# Patient Record
Sex: MALE | Race: WHITE | NOT HISPANIC OR LATINO | Employment: OTHER | URBAN - METROPOLITAN AREA
[De-identification: names, ages, dates, MRNs, and addresses within clinical notes are randomized per-mention and may not be internally consistent; named-entity substitution may affect disease eponyms.]

---

## 2023-07-25 ENCOUNTER — OFFICE VISIT (OUTPATIENT)
Dept: PRIMARY CARE CLINIC | Facility: CLINIC | Age: 43
End: 2023-07-25
Payer: COMMERCIAL

## 2023-07-25 ENCOUNTER — PATIENT MESSAGE (OUTPATIENT)
Dept: PRIMARY CARE CLINIC | Facility: CLINIC | Age: 43
End: 2023-07-25

## 2023-07-25 VITALS
BODY MASS INDEX: 38.23 KG/M2 | HEART RATE: 91 BPM | TEMPERATURE: 98 F | SYSTOLIC BLOOD PRESSURE: 136 MMHG | OXYGEN SATURATION: 96 % | RESPIRATION RATE: 18 BRPM | HEIGHT: 70 IN | DIASTOLIC BLOOD PRESSURE: 86 MMHG | WEIGHT: 267.06 LBS

## 2023-07-25 DIAGNOSIS — F17.210 CIGARETTE NICOTINE DEPENDENCE WITHOUT COMPLICATION: ICD-10-CM

## 2023-07-25 DIAGNOSIS — Z11.4 SCREENING FOR HIV (HUMAN IMMUNODEFICIENCY VIRUS): ICD-10-CM

## 2023-07-25 DIAGNOSIS — E66.01 SEVERE OBESITY (BMI 35.0-39.9) WITH COMORBIDITY: ICD-10-CM

## 2023-07-25 DIAGNOSIS — I10 ESSENTIAL HYPERTENSION, BENIGN: Primary | ICD-10-CM

## 2023-07-25 DIAGNOSIS — M10.071 IDIOPATHIC GOUT OF RIGHT FOOT, UNSPECIFIED CHRONICITY: ICD-10-CM

## 2023-07-25 DIAGNOSIS — Z13.6 ENCOUNTER FOR SCREENING FOR CARDIOVASCULAR DISORDERS: ICD-10-CM

## 2023-07-25 DIAGNOSIS — Z11.59 NEED FOR HEPATITIS C SCREENING TEST: ICD-10-CM

## 2023-07-25 DIAGNOSIS — Z13.1 SCREENING FOR DIABETES MELLITUS: ICD-10-CM

## 2023-07-25 PROBLEM — M10.9 GOUT, UNSPECIFIED: Status: ACTIVE | Noted: 2023-07-25

## 2023-07-25 PROCEDURE — 99204 OFFICE O/P NEW MOD 45 MIN: CPT | Mod: S$GLB,,, | Performed by: FAMILY MEDICINE

## 2023-07-25 PROCEDURE — 93010 ELECTROCARDIOGRAM REPORT: CPT | Mod: S$GLB,,, | Performed by: INTERNAL MEDICINE

## 2023-07-25 PROCEDURE — 99999 PR PBB SHADOW E&M-NEW PATIENT-LVL IV: CPT | Mod: PBBFAC,,, | Performed by: FAMILY MEDICINE

## 2023-07-25 PROCEDURE — 1160F PR REVIEW ALL MEDS BY PRESCRIBER/CLIN PHARMACIST DOCUMENTED: ICD-10-PCS | Mod: CPTII,S$GLB,, | Performed by: FAMILY MEDICINE

## 2023-07-25 PROCEDURE — 4010F ACE/ARB THERAPY RXD/TAKEN: CPT | Mod: CPTII,S$GLB,, | Performed by: FAMILY MEDICINE

## 2023-07-25 PROCEDURE — 99204 PR OFFICE/OUTPT VISIT, NEW, LEVL IV, 45-59 MIN: ICD-10-PCS | Mod: S$GLB,,, | Performed by: FAMILY MEDICINE

## 2023-07-25 PROCEDURE — 93005 EKG 12-LEAD: ICD-10-PCS | Mod: S$GLB,,, | Performed by: FAMILY MEDICINE

## 2023-07-25 PROCEDURE — 4010F PR ACE/ARB THEARPY RXD/TAKEN: ICD-10-PCS | Mod: CPTII,S$GLB,, | Performed by: FAMILY MEDICINE

## 2023-07-25 PROCEDURE — 93005 ELECTROCARDIOGRAM TRACING: CPT | Mod: S$GLB,,, | Performed by: FAMILY MEDICINE

## 2023-07-25 PROCEDURE — 3079F PR MOST RECENT DIASTOLIC BLOOD PRESSURE 80-89 MM HG: ICD-10-PCS | Mod: CPTII,S$GLB,, | Performed by: FAMILY MEDICINE

## 2023-07-25 PROCEDURE — 93010 EKG 12-LEAD: ICD-10-PCS | Mod: S$GLB,,, | Performed by: INTERNAL MEDICINE

## 2023-07-25 PROCEDURE — 3008F BODY MASS INDEX DOCD: CPT | Mod: CPTII,S$GLB,, | Performed by: FAMILY MEDICINE

## 2023-07-25 PROCEDURE — 3008F PR BODY MASS INDEX (BMI) DOCUMENTED: ICD-10-PCS | Mod: CPTII,S$GLB,, | Performed by: FAMILY MEDICINE

## 2023-07-25 PROCEDURE — 1159F MED LIST DOCD IN RCRD: CPT | Mod: CPTII,S$GLB,, | Performed by: FAMILY MEDICINE

## 2023-07-25 PROCEDURE — 99999 PR PBB SHADOW E&M-NEW PATIENT-LVL IV: ICD-10-PCS | Mod: PBBFAC,,, | Performed by: FAMILY MEDICINE

## 2023-07-25 PROCEDURE — 1159F PR MEDICATION LIST DOCUMENTED IN MEDICAL RECORD: ICD-10-PCS | Mod: CPTII,S$GLB,, | Performed by: FAMILY MEDICINE

## 2023-07-25 PROCEDURE — 3075F PR MOST RECENT SYSTOLIC BLOOD PRESS GE 130-139MM HG: ICD-10-PCS | Mod: CPTII,S$GLB,, | Performed by: FAMILY MEDICINE

## 2023-07-25 PROCEDURE — 3075F SYST BP GE 130 - 139MM HG: CPT | Mod: CPTII,S$GLB,, | Performed by: FAMILY MEDICINE

## 2023-07-25 PROCEDURE — 1160F RVW MEDS BY RX/DR IN RCRD: CPT | Mod: CPTII,S$GLB,, | Performed by: FAMILY MEDICINE

## 2023-07-25 PROCEDURE — 3079F DIAST BP 80-89 MM HG: CPT | Mod: CPTII,S$GLB,, | Performed by: FAMILY MEDICINE

## 2023-07-25 RX ORDER — LOSARTAN POTASSIUM 50 MG/1
50 TABLET ORAL DAILY
Qty: 90 TABLET | Refills: 3 | Status: SHIPPED | OUTPATIENT
Start: 2023-07-25 | End: 2023-08-23

## 2023-07-25 NOTE — PROGRESS NOTES
"Subjective:       Patient ID: Jeremy Fried is a 43 y.o. male.    Chief Complaint: Establish Care    Here for regular checkup and to establish care.  Has not had steady PCP in about a decade.  Went to urgent care about a year ago and was diagnosed with hypertension, says blood pressure had been consistently running in the 160s.  Was treated with lisinopril for a few months, which controls his blood pressure.  Has been out of medication since then, and his home blood pressure readings have been consistently elevated, as high as 160/120.    Review of Systems   Constitutional:  Positive for fatigue and unexpected weight change.   Respiratory:  Positive for shortness of breath.    Cardiovascular:  Negative for chest pain.   Gastrointestinal:  Negative for blood in stool.   Genitourinary:  Negative for difficulty urinating.   Allergic/Immunologic: Negative for immunocompromised state.   Neurological:  Negative for dizziness and light-headedness.   Hematological:  Does not bruise/bleed easily.   Psychiatric/Behavioral:  Negative for agitation and confusion.      Objective:      Vitals:    07/25/23 1005   BP: 136/86   BP Location: Left arm   Patient Position: Sitting   BP Method: Large (Manual)   Pulse: 91   Resp: 18   Temp: 97.6 °F (36.4 °C)   TempSrc: Temporal   SpO2: 96%   Weight: 121.2 kg (267 lb 1.4 oz)   Height: 5' 10" (1.778 m)     BP Readings from Last 5 Encounters:   07/25/23 136/86   06/16/16 (!) 143/89     Wt Readings from Last 5 Encounters:   07/25/23 121.2 kg (267 lb 1.4 oz)   06/16/16 104.8 kg (231 lb)     Physical Exam  Vitals and nursing note reviewed.   Constitutional:       General: He is not in acute distress.     Appearance: Normal appearance. He is well-developed.   HENT:      Head: Normocephalic and atraumatic.   Cardiovascular:      Rate and Rhythm: Normal rate and regular rhythm.      Heart sounds: Normal heart sounds.   Pulmonary:      Effort: Pulmonary effort is normal.      Breath sounds: Normal " breath sounds.   Abdominal:      General: There is no distension.      Tenderness: There is no abdominal tenderness.   Musculoskeletal:      Cervical back: Neck supple.      Right lower leg: No edema.      Left lower leg: No edema.   Skin:     General: Skin is warm and dry.   Neurological:      Mental Status: He is alert and oriented to person, place, and time.   Psychiatric:         Mood and Affect: Mood normal.         Behavior: Behavior normal.       Lab Results   Component Value Date    WBC 10.67 07/25/2023    HGB 15.2 07/25/2023    HCT 45.1 07/25/2023     07/25/2023    CHOL 221 (H) 07/25/2023    TRIG 182 (H) 07/25/2023    HDL 27 (L) 07/25/2023    ALT 49 (H) 07/25/2023    AST 29 07/25/2023     07/25/2023    K 4.2 07/25/2023     07/25/2023    CREATININE 1.0 07/25/2023    BUN 11 07/25/2023    CO2 24 07/25/2023    TSH 1.379 07/25/2023      Assessment:       1. Essential hypertension, benign    2. Severe obesity (BMI 35.0-39.9) with comorbidity    3. Screening for diabetes mellitus    4. Need for hepatitis C screening test    5. Screening for HIV (human immunodeficiency virus)    6. Encounter for screening for cardiovascular disorders    7. Cigarette nicotine dependence without complication    8. Idiopathic gout of right foot, unspecified chronicity        Plan:       Essential hypertension, benign  -     EKG 12-lead  -     CBC Auto Differential; Future; Expected date: 07/25/2023  -     Comprehensive Metabolic Panel; Future; Expected date: 07/25/2023  -     TSH; Future; Expected date: 07/25/2023  -     losartan (COZAAR) 50 MG tablet; Take 1 tablet (50 mg total) by mouth once daily.  Dispense: 90 tablet; Refill: 3  No acute EKG findings. Start losartan 50 milligrams daily.  Will message for updated home readings in 2 weeks.    Severe obesity (BMI 35.0-39.9) with comorbidity    Screening for diabetes mellitus  -     Hemoglobin A1C; Future; Expected date: 07/25/2023    Need for hepatitis C screening  test  -     Hepatitis C Antibody; Future; Expected date: 07/25/2023    Screening for HIV (human immunodeficiency virus)  -     HIV 1/2 Ag/Ab (4th Gen); Future; Expected date: 07/25/2023    Encounter for screening for cardiovascular disorders  -     Lipid Panel; Future; Expected date: 07/25/2023    Cigarette nicotine dependence without complication  -     Ambulatory referral/consult to Smoking Cessation Program; Future; Expected date: 08/01/2023    Idiopathic gout of right foot, unspecified chronicity  -     Uric Acid; Future; Expected date: 07/25/2023      Medication List with Changes/Refills   New Medications    LOSARTAN (COZAAR) 50 MG TABLET    Take 1 tablet (50 mg total) by mouth once daily.   Discontinued Medications    AMOXICILLIN (AMOXIL) 500 MG CAPSULE    Take 500 mg by mouth 3 (three) times daily.

## 2023-08-09 ENCOUNTER — PATIENT MESSAGE (OUTPATIENT)
Dept: PRIMARY CARE CLINIC | Facility: CLINIC | Age: 43
End: 2023-08-09
Payer: COMMERCIAL

## 2023-08-16 ENCOUNTER — PATIENT MESSAGE (OUTPATIENT)
Dept: PRIMARY CARE CLINIC | Facility: CLINIC | Age: 43
End: 2023-08-16
Payer: COMMERCIAL

## 2023-08-23 ENCOUNTER — PATIENT MESSAGE (OUTPATIENT)
Dept: PRIMARY CARE CLINIC | Facility: CLINIC | Age: 43
End: 2023-08-23
Payer: COMMERCIAL

## 2023-08-23 RX ORDER — OLMESARTAN MEDOXOMIL 40 MG/1
40 TABLET ORAL DAILY
Qty: 90 TABLET | Refills: 3 | Status: SHIPPED | OUTPATIENT
Start: 2023-08-23 | End: 2024-08-22

## 2023-09-18 ENCOUNTER — PATIENT MESSAGE (OUTPATIENT)
Dept: PRIMARY CARE CLINIC | Facility: CLINIC | Age: 43
End: 2023-09-18
Payer: COMMERCIAL

## 2023-10-18 ENCOUNTER — PATIENT MESSAGE (OUTPATIENT)
Dept: CARDIOLOGY | Facility: CLINIC | Age: 43
End: 2023-10-18
Payer: COMMERCIAL

## 2023-10-18 ENCOUNTER — PATIENT MESSAGE (OUTPATIENT)
Dept: PRIMARY CARE CLINIC | Facility: CLINIC | Age: 43
End: 2023-10-18
Payer: COMMERCIAL

## 2023-10-18 DIAGNOSIS — I10 ESSENTIAL HYPERTENSION, BENIGN: ICD-10-CM

## 2023-10-18 DIAGNOSIS — R09.89 LABILE BLOOD PRESSURE: Primary | ICD-10-CM

## 2023-10-25 ENCOUNTER — TELEPHONE (OUTPATIENT)
Dept: CARDIOLOGY | Facility: CLINIC | Age: 43
End: 2023-10-25

## 2023-10-25 ENCOUNTER — OFFICE VISIT (OUTPATIENT)
Dept: CARDIOLOGY | Facility: CLINIC | Age: 43
End: 2023-10-25
Payer: COMMERCIAL

## 2023-10-25 VITALS
BODY MASS INDEX: 38.8 KG/M2 | OXYGEN SATURATION: 92 % | SYSTOLIC BLOOD PRESSURE: 180 MMHG | WEIGHT: 270.38 LBS | HEART RATE: 93 BPM | DIASTOLIC BLOOD PRESSURE: 120 MMHG

## 2023-10-25 DIAGNOSIS — R55 SYNCOPE AND COLLAPSE: ICD-10-CM

## 2023-10-25 DIAGNOSIS — E79.0 HYPERURICEMIA: ICD-10-CM

## 2023-10-25 DIAGNOSIS — I10 ESSENTIAL HYPERTENSION, BENIGN: ICD-10-CM

## 2023-10-25 DIAGNOSIS — R06.09 DYSPNEA ON EXERTION: ICD-10-CM

## 2023-10-25 DIAGNOSIS — E78.2 MIXED HYPERLIPIDEMIA: Primary | ICD-10-CM

## 2023-10-25 DIAGNOSIS — M10.00 IDIOPATHIC GOUT, UNSPECIFIED CHRONICITY, UNSPECIFIED SITE: ICD-10-CM

## 2023-10-25 DIAGNOSIS — R09.89 LABILE BLOOD PRESSURE: ICD-10-CM

## 2023-10-25 PROCEDURE — 93000 ELECTROCARDIOGRAM COMPLETE: CPT | Mod: S$GLB,,, | Performed by: INTERNAL MEDICINE

## 2023-10-25 PROCEDURE — 3008F PR BODY MASS INDEX (BMI) DOCUMENTED: ICD-10-PCS | Mod: CPTII,S$GLB,, | Performed by: INTERNAL MEDICINE

## 2023-10-25 PROCEDURE — 99999 PR PBB SHADOW E&M-EST. PATIENT-LVL IV: CPT | Mod: PBBFAC,,, | Performed by: INTERNAL MEDICINE

## 2023-10-25 PROCEDURE — 3044F HG A1C LEVEL LT 7.0%: CPT | Mod: CPTII,S$GLB,, | Performed by: INTERNAL MEDICINE

## 2023-10-25 PROCEDURE — 99205 PR OFFICE/OUTPT VISIT, NEW, LEVL V, 60-74 MIN: ICD-10-PCS | Mod: 25,S$GLB,, | Performed by: INTERNAL MEDICINE

## 2023-10-25 PROCEDURE — 99205 OFFICE O/P NEW HI 60 MIN: CPT | Mod: 25,S$GLB,, | Performed by: INTERNAL MEDICINE

## 2023-10-25 PROCEDURE — 3044F PR MOST RECENT HEMOGLOBIN A1C LEVEL <7.0%: ICD-10-PCS | Mod: CPTII,S$GLB,, | Performed by: INTERNAL MEDICINE

## 2023-10-25 PROCEDURE — 3080F DIAST BP >= 90 MM HG: CPT | Mod: CPTII,S$GLB,, | Performed by: INTERNAL MEDICINE

## 2023-10-25 PROCEDURE — 99999 PR PBB SHADOW E&M-EST. PATIENT-LVL IV: ICD-10-PCS | Mod: PBBFAC,,, | Performed by: INTERNAL MEDICINE

## 2023-10-25 PROCEDURE — 1160F PR REVIEW ALL MEDS BY PRESCRIBER/CLIN PHARMACIST DOCUMENTED: ICD-10-PCS | Mod: CPTII,S$GLB,, | Performed by: INTERNAL MEDICINE

## 2023-10-25 PROCEDURE — 4010F PR ACE/ARB THEARPY RXD/TAKEN: ICD-10-PCS | Mod: CPTII,S$GLB,, | Performed by: INTERNAL MEDICINE

## 2023-10-25 PROCEDURE — 3077F SYST BP >= 140 MM HG: CPT | Mod: CPTII,S$GLB,, | Performed by: INTERNAL MEDICINE

## 2023-10-25 PROCEDURE — 3008F BODY MASS INDEX DOCD: CPT | Mod: CPTII,S$GLB,, | Performed by: INTERNAL MEDICINE

## 2023-10-25 PROCEDURE — 93000 EKG 12-LEAD: ICD-10-PCS | Mod: S$GLB,,, | Performed by: INTERNAL MEDICINE

## 2023-10-25 PROCEDURE — 3080F PR MOST RECENT DIASTOLIC BLOOD PRESSURE >= 90 MM HG: ICD-10-PCS | Mod: CPTII,S$GLB,, | Performed by: INTERNAL MEDICINE

## 2023-10-25 PROCEDURE — 1159F MED LIST DOCD IN RCRD: CPT | Mod: CPTII,S$GLB,, | Performed by: INTERNAL MEDICINE

## 2023-10-25 PROCEDURE — 3077F PR MOST RECENT SYSTOLIC BLOOD PRESSURE >= 140 MM HG: ICD-10-PCS | Mod: CPTII,S$GLB,, | Performed by: INTERNAL MEDICINE

## 2023-10-25 PROCEDURE — 4010F ACE/ARB THERAPY RXD/TAKEN: CPT | Mod: CPTII,S$GLB,, | Performed by: INTERNAL MEDICINE

## 2023-10-25 PROCEDURE — 1159F PR MEDICATION LIST DOCUMENTED IN MEDICAL RECORD: ICD-10-PCS | Mod: CPTII,S$GLB,, | Performed by: INTERNAL MEDICINE

## 2023-10-25 PROCEDURE — 1160F RVW MEDS BY RX/DR IN RCRD: CPT | Mod: CPTII,S$GLB,, | Performed by: INTERNAL MEDICINE

## 2023-10-25 RX ORDER — EPLERENONE 25 MG/1
25 TABLET, FILM COATED ORAL DAILY
Qty: 30 TABLET | Refills: 11 | Status: SHIPPED | OUTPATIENT
Start: 2023-10-25 | End: 2024-04-02
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
## 2023-11-06 ENCOUNTER — TELEPHONE (OUTPATIENT)
Dept: CARDIOLOGY | Facility: CLINIC | Age: 43
End: 2023-11-06
Payer: COMMERCIAL

## 2023-11-06 LAB
CV STRESS BASE HR: 117 BPM
DIASTOLIC BLOOD PRESSURE: 103 MMHG
OHS CV CPX 1 MINUTE RECOVERY HEART RATE: 150 BPM
OHS CV CPX 85 PERCENT MAX PREDICTED HEART RATE MALE: 152
OHS CV CPX ESTIMATED METS: 10
OHS CV CPX MAX PREDICTED HEART RATE: 179
OHS CV CPX PATIENT IS FEMALE: 0
OHS CV CPX PATIENT IS MALE: 1
OHS CV CPX PEAK DIASTOLIC BLOOD PRESSURE: 78 MMHG
OHS CV CPX PEAK HEAR RATE: 160 BPM
OHS CV CPX PEAK RATE PRESSURE PRODUCT: NORMAL
OHS CV CPX PEAK SYSTOLIC BLOOD PRESSURE: 244 MMHG
OHS CV CPX PERCENT MAX PREDICTED HEART RATE ACHIEVED: 89
OHS CV CPX RATE PRESSURE PRODUCT PRESENTING: NORMAL
STRESS ECHO POST EXERCISE DUR MIN: 8 MINUTES
STRESS ECHO POST EXERCISE DUR SEC: 0 SECONDS
SYSTOLIC BLOOD PRESSURE: 173 MMHG

## 2023-11-07 NOTE — TELEPHONE ENCOUNTER
I spoke with pt:  CXR and echocardiogram and stress test and CT of the brain WNL.  Pt reassured  Pt reports BP is varying from 98/60 to 140/115 at home.  Pt has a f/u office appt.

## 2023-11-08 ENCOUNTER — TELEPHONE (OUTPATIENT)
Dept: CARDIOLOGY | Facility: CLINIC | Age: 43
End: 2023-11-08
Payer: COMMERCIAL

## 2023-11-08 NOTE — TELEPHONE ENCOUNTER
I spoke with pt:  Holter is normal.  Heart rate is in a good range and there are rare extra beats which do not require treatment.  Pt reassured

## 2023-11-22 ENCOUNTER — TELEPHONE (OUTPATIENT)
Dept: CARDIOLOGY | Facility: CLINIC | Age: 43
End: 2023-11-22

## 2023-11-22 ENCOUNTER — OFFICE VISIT (OUTPATIENT)
Dept: CARDIOLOGY | Facility: CLINIC | Age: 43
End: 2023-11-22
Payer: COMMERCIAL

## 2023-11-22 VITALS
DIASTOLIC BLOOD PRESSURE: 74 MMHG | OXYGEN SATURATION: 96 % | BODY MASS INDEX: 38.58 KG/M2 | HEART RATE: 86 BPM | WEIGHT: 268.88 LBS | SYSTOLIC BLOOD PRESSURE: 125 MMHG

## 2023-11-22 DIAGNOSIS — F17.210 CIGARETTE NICOTINE DEPENDENCE WITHOUT COMPLICATION: ICD-10-CM

## 2023-11-22 DIAGNOSIS — I10 ESSENTIAL HYPERTENSION, BENIGN: Primary | ICD-10-CM

## 2023-11-22 DIAGNOSIS — M10.00 IDIOPATHIC GOUT, UNSPECIFIED CHRONICITY, UNSPECIFIED SITE: ICD-10-CM

## 2023-11-22 DIAGNOSIS — E78.2 MIXED HYPERLIPIDEMIA: ICD-10-CM

## 2023-11-22 PROCEDURE — 4010F PR ACE/ARB THEARPY RXD/TAKEN: ICD-10-PCS | Mod: CPTII,S$GLB,, | Performed by: INTERNAL MEDICINE

## 2023-11-22 PROCEDURE — 1160F PR REVIEW ALL MEDS BY PRESCRIBER/CLIN PHARMACIST DOCUMENTED: ICD-10-PCS | Mod: CPTII,S$GLB,, | Performed by: INTERNAL MEDICINE

## 2023-11-22 PROCEDURE — 3074F PR MOST RECENT SYSTOLIC BLOOD PRESSURE < 130 MM HG: ICD-10-PCS | Mod: CPTII,S$GLB,, | Performed by: INTERNAL MEDICINE

## 2023-11-22 PROCEDURE — 3008F PR BODY MASS INDEX (BMI) DOCUMENTED: ICD-10-PCS | Mod: CPTII,S$GLB,, | Performed by: INTERNAL MEDICINE

## 2023-11-22 PROCEDURE — 1159F MED LIST DOCD IN RCRD: CPT | Mod: CPTII,S$GLB,, | Performed by: INTERNAL MEDICINE

## 2023-11-22 PROCEDURE — 3078F PR MOST RECENT DIASTOLIC BLOOD PRESSURE < 80 MM HG: ICD-10-PCS | Mod: CPTII,S$GLB,, | Performed by: INTERNAL MEDICINE

## 2023-11-22 PROCEDURE — 3008F BODY MASS INDEX DOCD: CPT | Mod: CPTII,S$GLB,, | Performed by: INTERNAL MEDICINE

## 2023-11-22 PROCEDURE — 99213 PR OFFICE/OUTPT VISIT, EST, LEVL III, 20-29 MIN: ICD-10-PCS | Mod: S$GLB,,, | Performed by: INTERNAL MEDICINE

## 2023-11-22 PROCEDURE — 3074F SYST BP LT 130 MM HG: CPT | Mod: CPTII,S$GLB,, | Performed by: INTERNAL MEDICINE

## 2023-11-22 PROCEDURE — 3044F PR MOST RECENT HEMOGLOBIN A1C LEVEL <7.0%: ICD-10-PCS | Mod: CPTII,S$GLB,, | Performed by: INTERNAL MEDICINE

## 2023-11-22 PROCEDURE — 3044F HG A1C LEVEL LT 7.0%: CPT | Mod: CPTII,S$GLB,, | Performed by: INTERNAL MEDICINE

## 2023-11-22 PROCEDURE — 99213 OFFICE O/P EST LOW 20 MIN: CPT | Mod: S$GLB,,, | Performed by: INTERNAL MEDICINE

## 2023-11-22 PROCEDURE — 4010F ACE/ARB THERAPY RXD/TAKEN: CPT | Mod: CPTII,S$GLB,, | Performed by: INTERNAL MEDICINE

## 2023-11-22 PROCEDURE — 3078F DIAST BP <80 MM HG: CPT | Mod: CPTII,S$GLB,, | Performed by: INTERNAL MEDICINE

## 2023-11-22 PROCEDURE — 99999 PR PBB SHADOW E&M-EST. PATIENT-LVL III: ICD-10-PCS | Mod: PBBFAC,,, | Performed by: INTERNAL MEDICINE

## 2023-11-22 PROCEDURE — 1160F RVW MEDS BY RX/DR IN RCRD: CPT | Mod: CPTII,S$GLB,, | Performed by: INTERNAL MEDICINE

## 2023-11-22 PROCEDURE — 99999 PR PBB SHADOW E&M-EST. PATIENT-LVL III: CPT | Mod: PBBFAC,,, | Performed by: INTERNAL MEDICINE

## 2023-11-22 PROCEDURE — 1159F PR MEDICATION LIST DOCUMENTED IN MEDICAL RECORD: ICD-10-PCS | Mod: CPTII,S$GLB,, | Performed by: INTERNAL MEDICINE

## 2023-11-22 NOTE — PROGRESS NOTES
Subjective:      Patient ID: Jeremy Fried is a 43 y.o. male.    Chief Complaint: Follow-up    HPI:  No side effects from eplerenone    BP at home 130/90, once 150/90, lowest 99/65    No recurrent fainting    Pt once felt lightheaded for a split second upon arising quickly from the chair      Review of Systems   Cardiovascular:  Negative for chest pain, claudication, dyspnea on exertion, irregular heartbeat, leg swelling, near-syncope, orthopnea, palpitations and syncope.      Pt has cut back on ETOH    Smokes 20 cigarettes a day    Past Medical History:   Diagnosis Date    Gout, unspecified     Hypertension     Mixed hyperlipidemia 10/25/2023    Syncope and collapse     Thyroid disease         Past Surgical History:   Procedure Laterality Date    LIPOMA RESECTION  2000    Right shoulder lipoma removed       Family History   Problem Relation Age of Onset    Diabetes Mother     Hyperlipidemia Mother     Hypertension Mother     Lymphoma Mother     Hypertension Father     Cancer Father     Diabetes Father     Hyperlipidemia Father     No Known Problems Sister     No Known Problems Brother        Social History     Socioeconomic History    Marital status:    Occupational History    Occupation: swimming pool repair   Tobacco Use    Smoking status: Every Day     Current packs/day: 1.00     Average packs/day: 1 pack/day for 24.9 years (24.9 ttl pk-yrs)     Types: Cigarettes     Start date: 1999    Tobacco comments:     has tried wellbutrin, cutting back , patches, and gum   Substance and Sexual Activity    Alcohol use: Yes     Alcohol/week: 18.0 standard drinks of alcohol     Types: 3 Cans of beer, 15 Shots of liquor per week    Drug use: No    Sexual activity: Yes     Partners: Male     Birth control/protection: None       Current Outpatient Medications on File Prior to Visit   Medication Sig Dispense Refill    eplerenone (INSPRA) 25 MG Tab Take 1 tablet (25 mg total) by mouth once daily. 30 tablet 11     olmesartan (BENICAR) 40 MG tablet Take 1 tablet (40 mg total) by mouth once daily. 90 tablet 3     No current facility-administered medications on file prior to visit.       Review of patient's allergies indicates:  No Known Allergies  Objective:     Vitals:    11/22/23 1016   BP: 125/74   BP Location: Left arm   Patient Position: Sitting   BP Method: Large (Automatic)   Pulse: 86   SpO2: 96%   Weight: 122 kg (268 lb 13.6 oz)        Physical Exam  Constitutional:       General: He is not in acute distress.     Appearance: He is well-developed. He is not diaphoretic.   Eyes:      General: No scleral icterus.  Neck:      Vascular: No carotid bruit or JVD.   Cardiovascular:      Rate and Rhythm: Regular rhythm.      Heart sounds: Normal heart sounds. No murmur heard.     No friction rub. No gallop.   Pulmonary:      Effort: Pulmonary effort is normal. No respiratory distress.      Breath sounds: Normal breath sounds.   Musculoskeletal:      Right lower leg: No edema.      Left lower leg: No edema.   Skin:     General: Skin is warm and dry.   Neurological:      Mental Status: He is alert and oriented to person, place, and time.   Psychiatric:         Behavior: Behavior normal.         Thought Content: Thought content normal.         Judgment: Judgment normal.                Hospital Outpatient Visit on 11/03/2023   Component Date Value Ref Range Status    Event Monitor Day 11/03/2023 2   Final    holter length minutes 11/03/2023 59   Final    Holter length hours 11/03/2023 47   Final    holter length dec hours 11/03/2023 95.98   Final    Sinus min HR 11/03/2023 45   Final    Sinus max hr 11/03/2023 141   Final    Sinus avg hr 11/03/2023 94   Final   Hospital Outpatient Visit on 11/03/2023   Component Date Value Ref Range Status    BSA 11/03/2023 2.46  m2 Final    LVOT stroke volume 11/03/2023 53.28  cm3 Final    LVIDd 11/03/2023 4.77  3.5 - 6.0 cm Final    LV Systolic Volume 11/03/2023 25.80  mL Final    LV Systolic  Volume Index 11/03/2023 10.9  mL/m2 Final    LVIDs 11/03/2023 2.65  2.1 - 4.0 cm Final    LV Diastolic Volume 11/03/2023 106.19  mL Final    LV Diastolic Volume Index 11/03/2023 44.81  mL/m2 Final    IVS 11/03/2023 1.1  0.6 - 1.1 cm Final    LVOT diameter 11/03/2023 1.89  cm Final    LVOT area 11/03/2023 2.8  cm2 Final    FS 11/03/2023 44  28 - 44 % Final    Left Ventricle Relative Wall Thick* 11/03/2023 0.46  cm Final    Posterior Wall 11/03/2023 1.10  0.6 - 1.1 cm Final    LV mass 11/03/2023 192.02  g Final    LV Mass Index 11/03/2023 81  g/m2 Final    MV Peak E Dl 11/03/2023 0.52  m/s Final    TDI LATERAL 11/03/2023 0.13  m/s Final    TDI SEPTAL 11/03/2023 0.08  m/s Final    E/E' ratio 11/03/2023 4.95  m/s Final    MV Peak A Dl 11/03/2023 0.70  m/s Final    TR Max Dl 11/03/2023 2.41  m/s Final    E/A ratio 11/03/2023 0.74   Final    E wave deceleration time 11/03/2023 166.85  msec Final    LV SEPTAL E/E' RATIO 11/03/2023 6.50  m/s Final    LV LATERAL E/E' RATIO 11/03/2023 4.00  m/s Final    LVOT peak dl 11/03/2023 1.02  m/s Final    Left Ventricular Outflow Tract Svitlana* 11/03/2023 0.70  cm/s Final    Left Ventricular Outflow Tract Svitlana* 11/03/2023 2.27  mmHg Final    RVDD 11/03/2023 2.69  cm Final    Left Atrium Minor Axis 11/03/2023 2.52  cm Final    Left Atrium Major Axis 11/03/2023 4.85  cm Final    AV peak gradient 11/03/2023 7  mmHg Final    Ao peak dl 11/03/2023 1.28  m/s Final    LVOT peak VTI 11/03/2023 19.00  cm Final    AV Velocity Ratio 11/03/2023 0.80   Final    LEIGHTON by Velocity Ratio 11/03/2023 2.23  cm² Final    MV stenosis pressure 1/2 time 11/03/2023 48.39  ms Final    MV valve area p 1/2 method 11/03/2023 4.55  cm2 Final    Triscuspid Valve Regurgitation Pea* 11/03/2023 23  mmHg Final    PV PEAK VELOCITY 11/03/2023 1.06  m/s Final    PV peak gradient 11/03/2023 4  mmHg Final    Ao root annulus 11/03/2023 3.66  cm Final    IVC diameter 11/03/2023 1.60  cm Final    Mean e' 11/03/2023 0.11  m/s  Final    ZLVIDS 11/03/2023 -6.65   Final    ZLVIDD 11/03/2023 -7.65   Final    AORTIC VALVE CUSP SEPERATION 11/03/2023 1.88  cm Final    TV resting pulmonary artery pressu* 11/03/2023 26  mmHg Final    RV TB RVSP 11/03/2023 5  mmHg Final    Est. RA pres 11/03/2023 3  mmHg Final    EF 11/03/2023 65  % Final   Office Visit on 10/25/2023   Component Date Value Ref Range Status    85% Max Predicted HR 11/03/2023 152   Final    Max Predicted HR 11/03/2023 179   Final    [facility]HS CV CPX PATIENT IS MALE 11/03/2023 1.0   Final    [facility] CV CPX PATIENT IS FEMALE 11/03/2023 0.0   Final    Systolic blood pressure 11/03/2023 173  mmHg Final    Diastolic blood pressure 11/03/2023 103  mmHg Final    HR at rest 11/03/2023 117  bpm Final    Exercise duration (min) 11/03/2023 8  minutes Final    Exercise duration (sec) 11/03/2023 0  seconds Final    Peak Systolic BP 11/03/2023 244  mmHg Final    Peak Diatolic BP 11/03/2023 78  mmHg Final    Peak HR 11/03/2023 160  bpm Final    Estimated METs 11/03/2023 10   Final    % Max HR Achieved 11/03/2023 89   Final    1 Minute Recovery HR 11/03/2023 150  bpm Final    RPP 11/03/2023 20,241   Final    Peak RPP 11/03/2023 39,040   Final   Lab Visit on 07/25/2023   Component Date Value Ref Range Status    WBC 07/25/2023 10.67  3.90 - 12.70 K/uL Final    RBC 07/25/2023 4.64  4.60 - 6.20 M/uL Final    Hemoglobin 07/25/2023 15.2  14.0 - 18.0 g/dL Final    Hematocrit 07/25/2023 45.1  40.0 - 54.0 % Final    MCV 07/25/2023 97  82 - 98 fL Final    MCH 07/25/2023 32.8 (H)  27.0 - 31.0 pg Final    MCHC 07/25/2023 33.7  32.0 - 36.0 g/dL Final    RDW 07/25/2023 12.3  11.5 - 14.5 % Final    Platelets 07/25/2023 286  150 - 450 K/uL Final    MPV 07/25/2023 9.7  9.2 - 12.9 fL Final    Immature Granulocytes 07/25/2023 0.3  0.0 - 0.5 % Final    Gran # (ANC) 07/25/2023 6.6  1.8 - 7.7 K/uL Final    Immature Grans (Abs) 07/25/2023 0.03  0.00 - 0.04 K/uL Final    Lymph # 07/25/2023 2.8  1.0 - 4.8 K/uL Final    Mono #  07/25/2023 0.7  0.3 - 1.0 K/uL Final    Eos # 07/25/2023 0.4  0.0 - 0.5 K/uL Final    Baso # 07/25/2023 0.10  0.00 - 0.20 K/uL Final    nRBC 07/25/2023 0  0 /100 WBC Final    Gran % 07/25/2023 62.0  38.0 - 73.0 % Final    Lymph % 07/25/2023 25.9  18.0 - 48.0 % Final    Mono % 07/25/2023 6.9  4.0 - 15.0 % Final    Eosinophil % 07/25/2023 4.0  0.0 - 8.0 % Final    Basophil % 07/25/2023 0.9  0.0 - 1.9 % Final    Differential Method 07/25/2023 Automated   Final    Sodium 07/25/2023 139  136 - 145 mmol/L Final    Potassium 07/25/2023 4.2  3.5 - 5.1 mmol/L Final    Chloride 07/25/2023 104  95 - 110 mmol/L Final    CO2 07/25/2023 24  23 - 29 mmol/L Final    Glucose 07/25/2023 85  70 - 110 mg/dL Final    BUN 07/25/2023 11  6 - 20 mg/dL Final    Creatinine 07/25/2023 1.0  0.5 - 1.4 mg/dL Final    Calcium 07/25/2023 9.6  8.7 - 10.5 mg/dL Final    Total Protein 07/25/2023 7.9  6.0 - 8.4 g/dL Final    Albumin 07/25/2023 4.2  3.5 - 5.2 g/dL Final    Total Bilirubin 07/25/2023 0.7  0.1 - 1.0 mg/dL Final    Alkaline Phosphatase 07/25/2023 60  55 - 135 U/L Final    AST 07/25/2023 29  10 - 40 U/L Final    ALT 07/25/2023 49 (H)  10 - 44 U/L Final    eGFR 07/25/2023 >60.0  >60 mL/min/1.73 m^2 Final    Anion Gap 07/25/2023 11  8 - 16 mmol/L Final    Cholesterol 07/25/2023 221 (H)  120 - 199 mg/dL Final    Triglycerides 07/25/2023 182 (H)  30 - 150 mg/dL Final    HDL 07/25/2023 27 (L)  40 - 75 mg/dL Final    LDL Cholesterol 07/25/2023 157.6  63.0 - 159.0 mg/dL Final    HDL/Cholesterol Ratio 07/25/2023 12.2 (L)  20.0 - 50.0 % Final    Total Cholesterol/HDL Ratio 07/25/2023 8.2 (H)  2.0 - 5.0 Final    Non-HDL Cholesterol 07/25/2023 194  mg/dL Final    Hemoglobin A1C 07/25/2023 5.1  4.0 - 5.6 % Final    Estimated Avg Glucose 07/25/2023 100  68 - 131 mg/dL Final    TSH 07/25/2023 1.379  0.400 - 4.000 uIU/mL Final    Hepatitis C Ab 07/25/2023 Non-reactive  Non-reactive Final    HIV 1/2 Ag/Ab 07/25/2023 Non-reactive  Non-reactive Final     "Uric Acid 07/25/2023 8.3 (H)  3.4 - 7.0 mg/dL Final   (  Holter monitor - 48 hour    Height:  5' 10" (1.778 m)   Weight:  122.6 kg (270 lb 6.3 oz)   Blood Pressure:  Not recorded    Date of Study:  11/3/23   Ordering Provider:  Cory Oliva MD   Clinical Indications:  Labile blood pressure [R09.89 (ICD-10-CM)], Essential hypertension, benign [I10 (ICD-10-CM)], Mixed hyperlipidemia [E78.2 (ICD-10-CM)], Syncope and collapse [R55 (ICD-10-CM)], Dyspnea on exertion [R06.09 (ICD-10-CM)], Idiopathic gout, unspecified chronicity, unspecified site [M10.00 (ICD-10-CM)], Hyperuricemia [E79.0 (ICD-10-CM)]       Interpreting Physicians  Performing Staff   Cory Oliva MD Tech:  Hazel Lugo        Reason for Exam  Priority: Routine  Dx: Labile blood pressure [R09.89 (ICD-10-CM)]; Essential hypertension, benign [I10 (ICD-10-CM)]; Mixed hyperlipidemia [E78.2 (ICD-10-CM)]; Syncope and collapse [R55 (ICD-10-CM)]; Dyspnea on exertion [R06.09 (ICD-10-CM)]; Idiopathic gout, unspecified chronicity, unspecified site [M10.00 (ICD-10-CM)]; Hyperuricemia [E79.0 (ICD-10-CM)]     Conclusion         Normal sinus rhythm with a heart rate variable between 45 and 141 bpm, averaging 94 bpm    There are very rare, isolated PVC's    There are rare, isolated PAC's including 2 PAC couplets   Transthoracic echo (TTE) complete    Height:  5' 10" (1.778 m)   Weight:  122.5 kg (270 lb)   Blood Pressure:  Not recorded    Date of Study:  11/3/23   Ordering Provider:  Cory Oliva MD   Clinical Indications:  Labile blood pressure [R09.89 (ICD-10-CM)], Essential hypertension, benign [I10 (ICD-10-CM)], Mixed hyperlipidemia [E78.2 (ICD-10-CM)], Syncope and collapse [R55 (ICD-10-CM)], Dyspnea on exertion [R06.09 (ICD-10-CM)], Idiopathic gout, unspecified chronicity, unspecified site [M10.00 (ICD-10-CM)], Hyperuricemia [E79.0 (ICD-10-CM)]       Interpreting Physicians  Performing Staff   Cory Oliva MD Tech:  Tangela Tinoco    " "    Reason for Exam  Priority: Routine  Dx: Labile blood pressure [R09.89 (ICD-10-CM)]; Essential hypertension, benign [I10 (ICD-10-CM)]; Mixed hyperlipidemia [E78.2 (ICD-10-CM)]; Syncope and collapse [R55 (ICD-10-CM)]; Dyspnea on exertion [R06.09 (ICD-10-CM)]; Idiopathic gout, unspecified chronicity, unspecified site [M10.00 (ICD-10-CM)]; Hyperuricemia [E79.0 (ICD-10-CM)]     View Images Vital Vitrea     Show images for Echo  Summary         Left Ventricle: The left ventricle is normal in size. Normal wall thickness. Normal wall motion. There is normal systolic function. Ejection fraction by visual approximation is 65%. There is normal diastolic function.    Right Ventricle: Normal right ventricular cavity size. Wall thickness is normal. Right ventricle wall motion  is normal. Systolic function is normal.    Mitral Valve: There is no stenosis.    Pulmonic Valve: There is no stenosis.    IVC/SVC: Normal venous pressure at 3 mmHg.     Vitals  Accession #: [id number]  Exercise Stress - EKG    Height:  5' 10" (1.778 m)   Weight:  122.5 kg (270 lb)   Blood Pressure:  Not recorded    Date of Study:  11/3/23   Ordering Provider:  Cory Oliva MD   Clinical Indications:  Labile blood pressure [R09.89 (ICD-10-CM)], Essential hypertension, benign [I10 (ICD-10-CM)], Mixed hyperlipidemia [E78.2 (ICD-10-CM)], Syncope and collapse [R55 (ICD-10-CM)], Dyspnea on exertion [R06.09 (ICD-10-CM)], Idiopathic gout, unspecified chronicity, unspecified site [M10.00 (ICD-10-CM)], Hyperuricemia [E79.0 (ICD-10-CM)]       Interpreting Physicians  Performing Staff   Cory Oliva MD Tech:  Hazel Lugo        Reason for Exam  Priority: Routine  Dx: Labile blood pressure [R09.89 (ICD-10-CM)]; Essential hypertension, benign [I10 (ICD-10-CM)]; Mixed hyperlipidemia [E78.2 (ICD-10-CM)]; Syncope and collapse [R55 (ICD-10-CM)]; Dyspnea on exertion [R06.09 (ICD-10-CM)]; Idiopathic gout, unspecified chronicity, unspecified site [M10.00 " "(ICD-10-CM)]; Hyperuricemia [E79.0 (ICD-10-CM)]     Conclusion         The patient exercised for 8 minutes 0 seconds on a Gasper protocol, corresponding to a functional capacity of 10 METS, achieving a peak heart rate of 160 bpm, which is 89 % of the age predicted maximum heart rate.    The ECG portion of the study is negative for ischemia.    The patient reported no chest pain during the stress test.    The blood pressure response to stress was normal.    There were no arrhythmias during stress.     Performing Clinician    Hazel Lugo Reason for Exam  Priority: Routine  Dx: Labile blood pressure [R09.89 (ICD-10-CM)]; Essential hypertension, benign [I10 (ICD-10-CM)]; Mixed hyperlipidemia [E78.2 (ICD-10-CM)]; Syncope and collapse [R55 (ICD-10-CM)]; Dyspnea on exertion [R06.09 (ICD-10-CM)]; Idiopathic gout, unspecified chronicity, unspecified site [M10.00 (ICD-10-CM)]; Hyperuricemia [E79.0 (ICD-10-CM)]      Vitals    Height Weight BMI (Calculated) BSA (Calculated - sq m) BP Pulse   5' 10" (1.778 m) 122.5 kg (270 lb) 38.7 2.46 sq meters       MUSE - Stress Result Hyperlink     Show images for Exercise Stress - EKG  View Images Vital Vitrea     Show images for Exercise Stress - EKG  Epiphany Scans -- Order Level:    Epiphany Scans: None found at the order level.      Stress Protocol    Stress Findings    The patient exercised for 8 minutes 0 seconds on a Gasper protocol, corresponding to a functional capacity of 10 METS, achieving a peak heart rate of 160 bpm, which is 89 % of the age predicted maximum heart rate. The patient reported no symptoms during the stress test. The test was stopped because the end of the protocol was reached.     Baseline ECG    The Baseline ECG reveals sinus tachycardia.     Stress/Recovery ECG    There are no ST segment deviation identified during the protocol. There are no arrhythmias during stress. There is normal blood pressure response with stress.     ECG Conclusion    The ECG portion " of the study is negative for ischemia.     Stress Vitals    Baseline Vitals   HR at rest 117 bpm         Systolic blood pressure 173 mmHg         Diastolic blood pressure 103 mmHg          Stress Vitals   Peak  bpm         Peak Systolic  mmHg         Peak Diatolic BP 78 mmHg         Estimated METs 10           Heart Rate   85% Max Predicted           % Max HR Achieved 89          1 Minute Recovery  bpm         Max Predicted                  Cory Oliva MD on 11/6/2023 18:17     CT Head Without Contrast  Order: [id number]  Status: Final result     Visible to patient: Yes (seen)     Next appt: 01/29/2024 at 10:00 AM in [facility] (Jeremy Gomez MD)     Dx: Syncope and collapse     0 Result Notes  Details    Reading Physician Reading Date Result Priority   Wicho Xiong MD  [phone] 11/3/2023 Routine     Narrative & Impression  EXAMINATION:  CT HEAD WITHOUT CONTRAST     CLINICAL HISTORY:  Syncope, recurrent; Syncope and collapse     TECHNIQUE:  Low dose axial CT images obtained throughout the head without intravenous contrast. Sagittal and coronal reconstructions were performed.     COMPARISON:  None.     FINDINGS:  Intracranial compartment:     Ventricles and sulci are normal in size for age without evidence of hydrocephalus. No extra-axial blood or fluid collections.     The brain parenchyma appears normal. No parenchymal mass, hemorrhage, edema or major vascular distribution infarct.     Skull/extracranial contents (limited evaluation): No fracture. Mastoid air cells are clear.Paranasal sinuses demonstrate mild mucosal thickening.     Impression:     No acute abnormality.        Electronically signed by: Wicho Xiong MD  Date:                                            11/03/2023  Time:                                  Assessment:     1. Essential hypertension, benign    2. Mixed hyperlipidemia    3. Idiopathic gout, unspecified chronicity, unspecified site       Plan:   Jeremy was seen today for follow-up.    Diagnoses and all orders for this visit:    Essential hypertension, benign  -     Basic Metabolic Panel; Future    Mixed hyperlipidemia  -     Basic Metabolic Panel; Future    Idiopathic gout, unspecified chronicity, unspecified site  -     Basic Metabolic Panel; Future    HBP is adequately controlled on current regimen     Continue to monitor BP at home .  If systolic BP goes down into the 90's then cut the eplerenone in half    F/u with Dr Gomez in January    [facility] 3 months with [facility]    Smoking cessation counseled    Follow up in about 3 months (around 2/22/2024).

## 2024-01-07 ENCOUNTER — PATIENT MESSAGE (OUTPATIENT)
Dept: PRIMARY CARE CLINIC | Facility: CLINIC | Age: 44
End: 2024-01-07
Payer: COMMERCIAL

## 2024-01-09 ENCOUNTER — TELEPHONE (OUTPATIENT)
Dept: PRIMARY CARE CLINIC | Facility: CLINIC | Age: 44
End: 2024-01-09
Payer: COMMERCIAL

## 2024-01-09 NOTE — TELEPHONE ENCOUNTER
----- Message from Christine Irene sent at 1/9/2024  9:14 AM CST -----  Contact: Pt [phone]  Patient is returning a phone call.  Who left a message for the patient: Maggi Barragan MA  Does patient know what this is regarding:  yes  Would you like a call back, or a response through your [facility] portal?:  Call bacl  Comments:   Pt said yes to the virtual appt today at 4:00pm    Please call and advise.    Thank You

## 2024-01-09 NOTE — TELEPHONE ENCOUNTER
Called pt regarding message. Same day virtual appointment has been scheduled, pt verbalized understanding.

## 2024-01-10 ENCOUNTER — OFFICE VISIT (OUTPATIENT)
Dept: PRIMARY CARE CLINIC | Facility: CLINIC | Age: 44
End: 2024-01-10
Payer: COMMERCIAL

## 2024-01-10 DIAGNOSIS — M10.071 ACUTE IDIOPATHIC GOUT OF RIGHT FOOT: Primary | ICD-10-CM

## 2024-01-10 PROCEDURE — 99214 OFFICE O/P EST MOD 30 MIN: CPT | Mod: 95,,, | Performed by: FAMILY MEDICINE

## 2024-01-10 PROCEDURE — 4010F ACE/ARB THERAPY RXD/TAKEN: CPT | Mod: CPTII,95,, | Performed by: FAMILY MEDICINE

## 2024-01-10 PROCEDURE — 1160F RVW MEDS BY RX/DR IN RCRD: CPT | Mod: CPTII,95,, | Performed by: FAMILY MEDICINE

## 2024-01-10 PROCEDURE — 1159F MED LIST DOCD IN RCRD: CPT | Mod: CPTII,95,, | Performed by: FAMILY MEDICINE

## 2024-01-10 RX ORDER — PREDNISONE 20 MG/1
40 TABLET ORAL DAILY
Qty: 6 TABLET | Refills: 0 | Status: SHIPPED | OUTPATIENT
Start: 2024-01-10 | End: 2024-01-29

## 2024-01-10 RX ORDER — INDOMETHACIN 50 MG/1
50 CAPSULE ORAL 3 TIMES DAILY PRN
Qty: 60 CAPSULE | Refills: 2 | Status: SHIPPED | OUTPATIENT
Start: 2024-01-10 | End: 2024-01-29

## 2024-01-10 NOTE — PROGRESS NOTES
Subjective:       Patient ID: Jeremy Fried is a 43 y.o. male.    Chief Complaint: No chief complaint on file.      43-year-old male history of gout reports right foot pain and swelling since last Friday.  Symptoms got worse over the next few days, very sensitive to even light touch.  Feeling a little better today.  Says area is warm to the touch.  No known injury.  Has been using ibuprofen and drinking a lot of water.  In the past has taken indomethacin, but ran out      The patient location is: [place]  The chief complaint leading to consultation is: gout    Visit type: audiovisual    Face to Face time with patient: 4 minutes  8 minutes of total time spent on the encounter, which includes face to face time and non-face to face time preparing to see the patient (eg, review of tests), Obtaining and/or reviewing separately obtained history, Documenting clinical information in the electronic or other health record, Independently interpreting results (not separately reported) and communicating results to the patient/family/caregiver, or Care coordination (not separately reported).         Each patient to whom he or she provides medical services by telemedicine is:  (1) informed of the relationship between the physician and patient and the respective role of any other health care provider with respect to management of the patient; and (2) notified that he or she may decline to receive medical services by telemedicine and may withdraw from such care at any time.    Notes:    Review of Systems   Constitutional:  Negative for fever.   Musculoskeletal:  Positive for arthralgias and joint swelling.   Skin:  Positive for color change. Negative for wound.   Psychiatric/Behavioral:  Negative for agitation and confusion.        Objective:      There were no vitals filed for this visit.  Physical Exam  Constitutional:       General: He is not in acute distress.     Appearance: Normal appearance. He is well-developed.   Pulmonary:       Effort: No respiratory distress.   Musculoskeletal:        Feet:    Neurological:      Mental Status: He is alert and oriented to person, place, and time.   Psychiatric:         Behavior: Behavior normal.         Lab Results   Component Value Date    WBC 10.67 07/25/2023    HGB 15.2 07/25/2023    HCT 45.1 07/25/2023     07/25/2023    CHOL 221 (H) 07/25/2023    TRIG 182 (H) 07/25/2023    HDL 27 (L) 07/25/2023    ALT 49 (H) 07/25/2023    AST 29 07/25/2023     11/22/2023    K 4.9 11/22/2023     11/22/2023    CREATININE 1.1 11/22/2023    BUN 19 11/22/2023    CO2 23 11/22/2023    TSH 1.379 07/25/2023    HGBA1C 5.1 07/25/2023      Assessment:       1. Acute idiopathic gout of right foot        Plan:       Acute idiopathic gout of right foot  -     indomethacin (INDOCIN) 50 MG capsule; Take 1 capsule (50 mg total) by mouth 3 (three) times daily as needed (gout).  Dispense: 60 capsule; Refill: 2  -     predniSONE (DELTASONE) 20 MG tablet; Take 2 tablets (40 mg total) by mouth once daily. for 3 days  Dispense: 6 tablet; Refill: 0  Use prednisone for 3 days, then switch to indomethacin p.r.n.     Medication List with Changes/Refills   New Medications    INDOMETHACIN (INDOCIN) 50 MG CAPSULE    Take 1 capsule (50 mg total) by mouth 3 (three) times daily as needed (gout).    PREDNISONE (DELTASONE) 20 MG TABLET    Take 2 tablets (40 mg total) by mouth once daily. for 3 days   Current Medications    EPLERENONE (INSPRA) 25 MG TAB    Take 1 tablet (25 mg total) by mouth once daily.    OLMESARTAN (BENICAR) 40 MG TABLET    Take 1 tablet (40 mg total) by mouth once daily.

## 2024-01-29 ENCOUNTER — OFFICE VISIT (OUTPATIENT)
Dept: PRIMARY CARE CLINIC | Facility: CLINIC | Age: 44
End: 2024-01-29
Payer: COMMERCIAL

## 2024-01-29 VITALS
TEMPERATURE: 97 F | OXYGEN SATURATION: 98 % | HEIGHT: 70 IN | BODY MASS INDEX: 38.81 KG/M2 | SYSTOLIC BLOOD PRESSURE: 130 MMHG | RESPIRATION RATE: 18 BRPM | WEIGHT: 271.06 LBS | DIASTOLIC BLOOD PRESSURE: 80 MMHG | HEART RATE: 90 BPM

## 2024-01-29 DIAGNOSIS — E66.01 SEVERE OBESITY (BMI 35.0-39.9) WITH COMORBIDITY: ICD-10-CM

## 2024-01-29 DIAGNOSIS — R53.83 FATIGUE, UNSPECIFIED TYPE: ICD-10-CM

## 2024-01-29 DIAGNOSIS — L98.9 SKIN LESION: ICD-10-CM

## 2024-01-29 DIAGNOSIS — R06.83 LOUD SNORING: ICD-10-CM

## 2024-01-29 DIAGNOSIS — F17.210 CIGARETTE NICOTINE DEPENDENCE WITHOUT COMPLICATION: ICD-10-CM

## 2024-01-29 DIAGNOSIS — Z23 NEED FOR VACCINATION: ICD-10-CM

## 2024-01-29 DIAGNOSIS — I10 ESSENTIAL HYPERTENSION, BENIGN: Primary | ICD-10-CM

## 2024-01-29 PROCEDURE — 90472 IMMUNIZATION ADMIN EACH ADD: CPT | Mod: S$GLB,,, | Performed by: FAMILY MEDICINE

## 2024-01-29 PROCEDURE — 3075F SYST BP GE 130 - 139MM HG: CPT | Mod: CPTII,S$GLB,, | Performed by: FAMILY MEDICINE

## 2024-01-29 PROCEDURE — 3079F DIAST BP 80-89 MM HG: CPT | Mod: CPTII,S$GLB,, | Performed by: FAMILY MEDICINE

## 2024-01-29 PROCEDURE — 90471 IMMUNIZATION ADMIN: CPT | Mod: S$GLB,,, | Performed by: FAMILY MEDICINE

## 2024-01-29 PROCEDURE — 1160F RVW MEDS BY RX/DR IN RCRD: CPT | Mod: CPTII,S$GLB,, | Performed by: FAMILY MEDICINE

## 2024-01-29 PROCEDURE — 4010F ACE/ARB THERAPY RXD/TAKEN: CPT | Mod: CPTII,S$GLB,, | Performed by: FAMILY MEDICINE

## 2024-01-29 PROCEDURE — 90686 IIV4 VACC NO PRSV 0.5 ML IM: CPT | Mod: S$GLB,,, | Performed by: FAMILY MEDICINE

## 2024-01-29 PROCEDURE — 90677 PCV20 VACCINE IM: CPT | Mod: S$GLB,,, | Performed by: FAMILY MEDICINE

## 2024-01-29 PROCEDURE — 99214 OFFICE O/P EST MOD 30 MIN: CPT | Mod: 25,S$GLB,, | Performed by: FAMILY MEDICINE

## 2024-01-29 PROCEDURE — 99999 PR PBB SHADOW E&M-EST. PATIENT-LVL V: CPT | Mod: PBBFAC,,, | Performed by: FAMILY MEDICINE

## 2024-01-29 PROCEDURE — 1159F MED LIST DOCD IN RCRD: CPT | Mod: CPTII,S$GLB,, | Performed by: FAMILY MEDICINE

## 2024-01-29 PROCEDURE — 3008F BODY MASS INDEX DOCD: CPT | Mod: CPTII,S$GLB,, | Performed by: FAMILY MEDICINE

## 2024-01-29 NOTE — PROGRESS NOTES
Verified pt by name and . NKDA. Per physician orders pt was administered Influenza IM to left deltoid using aseptic technique. Pt tolerated well. No adverse effects or pain reported. MD notified.   Verified pt by name and . NKDA. Per physician orders pt was administered Prevnar 20 IM to right deltoid using aseptic technique. Pt tolerated well. No adverse effects or pain reported. MD notified.

## 2024-01-29 NOTE — PROGRESS NOTES
"Subjective:       Patient ID: Jeremy Fried is a 44 y.o. male.    Chief Complaint: Follow-up (6 month follow up/reg check up)    Here for regular checkup.  Blood pressure consistently good on home monitoring.  Has been noting persistent fatigue.  Snores loudly wakes up feeling unrefreshed.  Stress test negative for ischemia late last year.    Follow-up  Associated symptoms include fatigue. Pertinent negatives include no chest pain.     Review of Systems   Constitutional:  Positive for fatigue.   Respiratory:  Negative for shortness of breath.    Cardiovascular:  Negative for chest pain.   Psychiatric/Behavioral:  Negative for agitation and confusion.        Objective:      Vitals:    01/29/24 1011 01/29/24 1038   BP: (!) 158/100 130/80   BP Location: Right arm    Patient Position: Sitting    BP Method: Medium (Manual)    Pulse: 90    Resp: 18    Temp: 97.4 °F (36.3 °C)    TempSrc: Temporal    SpO2: 98%    Weight: 123 kg (271 lb 0.9 oz)    Height: 5' 10" (1.778 m)      BP Readings from Last 5 Encounters:   01/29/24 130/80   11/22/23 125/74   10/25/23 (!) 180/120   07/25/23 136/86   06/16/16 (!) 143/89     Wt Readings from Last 5 Encounters:   01/29/24 123 kg (271 lb 0.9 oz)   11/22/23 122 kg (268 lb 13.6 oz)   11/03/23 122.5 kg (270 lb)   10/25/23 122.7 kg (270 lb 6.3 oz)   07/25/23 121.2 kg (267 lb 1.4 oz)     Physical Exam  Vitals and nursing note reviewed.   Constitutional:       General: He is not in acute distress.     Appearance: Normal appearance. He is well-developed.   HENT:      Head: Normocephalic and atraumatic.   Cardiovascular:      Rate and Rhythm: Normal rate and regular rhythm.      Heart sounds: Normal heart sounds.   Pulmonary:      Effort: Pulmonary effort is normal.      Breath sounds: Normal breath sounds.   Musculoskeletal:      Right lower leg: No edema.      Left lower leg: No edema.   Skin:     General: Skin is warm and dry.   Neurological:      Mental Status: He is alert and oriented to " person, place, and time.   Psychiatric:         Mood and Affect: Mood normal.         Behavior: Behavior normal.         Lab Results   Component Value Date    WBC 10.67 07/25/2023    HGB 15.2 07/25/2023    HCT 45.1 07/25/2023     07/25/2023    CHOL 221 (H) 07/25/2023    TRIG 182 (H) 07/25/2023    HDL 27 (L) 07/25/2023    ALT 49 (H) 07/25/2023    AST 29 07/25/2023     11/22/2023    K 4.9 11/22/2023     11/22/2023    CREATININE 1.1 11/22/2023    BUN 19 11/22/2023    CO2 23 11/22/2023    TSH 1.379 07/25/2023    HGBA1C 5.1 07/25/2023      Assessment:       1. Essential hypertension, benign    2. Severe obesity (BMI 35.0-39.9) with comorbidity    3. Loud snoring    4. Fatigue, unspecified type    5. Cigarette nicotine dependence without complication    6. Need for vaccination    7. Skin lesion        Plan:       Essential hypertension, benign  Stable on current regimen  Severe obesity (BMI 35.0-39.9) with comorbidity  Lifestyle modification  Loud snoring  -     Ambulatory referral/consult to Sleep Disorders; Future; Expected date: 02/05/2024  Rule out GABRIELLE  Fatigue, unspecified type  -     Ambulatory referral/consult to Sleep Disorders; Future; Expected date: 02/05/2024    Cigarette nicotine dependence without complication  Patient will be starting smoking cessation counseling  Need for vaccination  -     Influenza - Quadrivalent *Preferred* (6 months+) (PF)  -     Pneumococcal Conjugate Vaccine (20 Valent) (IM)(Preferred)    Skin lesion  -     Ambulatory referral/consult to Dermatology; Future; Expected date: 02/05/2024      Medication List with Changes/Refills   Current Medications    EPLERENONE (INSPRA) 25 MG TAB    Take 1 tablet (25 mg total) by mouth once daily.    OLMESARTAN (BENICAR) 40 MG TABLET    Take 1 tablet (40 mg total) by mouth once daily.   Discontinued Medications    INDOMETHACIN (INDOCIN) 50 MG CAPSULE    Take 1 capsule (50 mg total) by mouth 3 (three) times daily as needed (gout).     PREDNISONE (DELTASONE) 20 MG TABLET    Take 2 tablets (40 mg total) by mouth once daily. for 3 days

## 2024-02-20 ENCOUNTER — TELEPHONE (OUTPATIENT)
Dept: CARDIOLOGY | Facility: CLINIC | Age: 44
End: 2024-02-20
Payer: COMMERCIAL

## 2024-02-20 NOTE — TELEPHONE ENCOUNTER
----- Message from Huma Aponte sent at 2/20/2024 12:50 PM CST -----  Consult/Advisory    Name Of Caller:Jeremy       Contact Preference:[phone]    Nature of call: Ptn has to reschedule appt due to having Covid nothing is showing open please call ptn to assist

## 2024-02-20 NOTE — TELEPHONE ENCOUNTER
Patient has covid, assisted with rescheduling cardiology appt to 4/02/24 @ 2:20 PM.  Informed patient that Dr. Oliva is retiring and that cardiology appt is scheduled with DR Jeremy Grimaldo.

## 2024-04-02 ENCOUNTER — OFFICE VISIT (OUTPATIENT)
Dept: CARDIOLOGY | Facility: CLINIC | Age: 44
End: 2024-04-02
Payer: COMMERCIAL

## 2024-04-02 VITALS
HEIGHT: 70 IN | BODY MASS INDEX: 38.3 KG/M2 | DIASTOLIC BLOOD PRESSURE: 92 MMHG | SYSTOLIC BLOOD PRESSURE: 144 MMHG | WEIGHT: 267.5 LBS | OXYGEN SATURATION: 96 % | HEART RATE: 80 BPM

## 2024-04-02 DIAGNOSIS — R06.09 DYSPNEA ON EXERTION: ICD-10-CM

## 2024-04-02 DIAGNOSIS — I10 ESSENTIAL HYPERTENSION, BENIGN: ICD-10-CM

## 2024-04-02 DIAGNOSIS — E78.2 MIXED HYPERLIPIDEMIA: Primary | ICD-10-CM

## 2024-04-02 DIAGNOSIS — R09.89 LABILE BLOOD PRESSURE: ICD-10-CM

## 2024-04-02 PROCEDURE — 3077F SYST BP >= 140 MM HG: CPT | Mod: CPTII,S$GLB,, | Performed by: INTERNAL MEDICINE

## 2024-04-02 PROCEDURE — 3008F BODY MASS INDEX DOCD: CPT | Mod: CPTII,S$GLB,, | Performed by: INTERNAL MEDICINE

## 2024-04-02 PROCEDURE — 1160F RVW MEDS BY RX/DR IN RCRD: CPT | Mod: CPTII,S$GLB,, | Performed by: INTERNAL MEDICINE

## 2024-04-02 PROCEDURE — 4010F ACE/ARB THERAPY RXD/TAKEN: CPT | Mod: CPTII,S$GLB,, | Performed by: INTERNAL MEDICINE

## 2024-04-02 PROCEDURE — 99204 OFFICE O/P NEW MOD 45 MIN: CPT | Mod: S$GLB,,, | Performed by: INTERNAL MEDICINE

## 2024-04-02 PROCEDURE — 99999 PR PBB SHADOW E&M-EST. PATIENT-LVL III: CPT | Mod: PBBFAC,,, | Performed by: INTERNAL MEDICINE

## 2024-04-02 PROCEDURE — 1159F MED LIST DOCD IN RCRD: CPT | Mod: CPTII,S$GLB,, | Performed by: INTERNAL MEDICINE

## 2024-04-02 PROCEDURE — 3080F DIAST BP >= 90 MM HG: CPT | Mod: CPTII,S$GLB,, | Performed by: INTERNAL MEDICINE

## 2024-04-02 RX ORDER — CARVEDILOL 3.12 MG/1
3.12 TABLET ORAL 2 TIMES DAILY WITH MEALS
Qty: 180 TABLET | Refills: 1 | Status: SHIPPED | OUTPATIENT
Start: 2024-04-02 | End: 2025-04-02

## 2024-04-02 RX ORDER — ATORVASTATIN CALCIUM 20 MG/1
20 TABLET, FILM COATED ORAL DAILY
Qty: 90 TABLET | Refills: 3 | Status: SHIPPED | OUTPATIENT
Start: 2024-04-02 | End: 2025-04-02

## 2024-04-02 NOTE — PROGRESS NOTES
[id number]  Cardiology Clinic Note      Chief Complaint  Chief Complaint   Patient presents with    Follow-up       HPI:      44-year-old male past medical history thyroid disease, syncope, tobacco use, hypertension, hyperlipidemia, Holter 11/2023 no significant abnormalities, exercise stress EKG negative for ischemia 11/2023, echo 11/2023 normal EF normal diastolic function normal RV normal CVP    No reported labile blood pressure and dyspnea  No current complaints  The patient notices blood pressure is lower at night    EKG 10/2023 normal sinus rhythm nonspecific ST-T changes    Medications  Current Outpatient Medications   Medication Sig Dispense Refill    olmesartan (BENICAR) 40 MG tablet Take 1 tablet (40 mg total) by mouth once daily. 90 tablet 3    atorvastatin (LIPITOR) 20 MG tablet Take 1 tablet (20 mg total) by mouth once daily. 90 tablet 3    carvediloL (COREG) 3.125 MG tablet Take 1 tablet (3.125 mg total) by mouth 2 (two) times daily with meals. 180 tablet 1     No current facility-administered medications for this visit.        History  Past Medical History:   Diagnosis Date    Gout, unspecified     Hypertension     Mixed hyperlipidemia 10/25/2023    Syncope and collapse     Thyroid disease      Past Surgical History:   Procedure Laterality Date    LIPOMA RESECTION  2000    Right shoulder lipoma removed     Social History     Socioeconomic History    Marital status:    Occupational History    Occupation: swimming pool repair   Tobacco Use    Smoking status: Every Day     Current packs/day: 1.00     Average packs/day: 1 pack/day for 25.3 years (25.3 ttl pk-yrs)     Types: Cigarettes     Start date: 1999    Tobacco comments:     has tried wellbutrin, cutting back , patches, and gum   Substance and Sexual Activity    Alcohol use: Yes     Alcohol/week: 18.0 standard drinks of alcohol     Types: 3 Cans of beer, 15 Shots of liquor per week    Drug use: No    Sexual activity: Yes      Partners: Male     Birth control/protection: None     Social Determinants of Health     Financial Resource Strain: Low Risk  (4/2/2024)    Overall Financial Resource Strain (CARDIA)     Difficulty of Paying Living Expenses: Not hard at all   Food Insecurity: No Food Insecurity (4/2/2024)    Hunger Vital Sign     Worried About Running Out of Food in the Last Year: Never true     Ran Out of Food in the Last Year: Never true   Transportation Needs: No Transportation Needs (4/2/2024)    PRAPARE - Transportation     Lack of Transportation (Medical): No     Lack of Transportation (Non-Medical): No   Physical Activity: Insufficiently Active (4/2/2024)    Exercise Vital Sign     Days of Exercise per Week: 1 day     Minutes of Exercise per Session: 10 min   Stress: Stress Concern Present (4/2/2024)    [employer] [employer] - Occupational Stress Questionnaire     Feeling of Stress : Rather much   Social Connections: Unknown (4/2/2024)    Social Connection and Isolation Panel [NHANES]     Frequency of Communication with Friends and Family: More than three times a week     Frequency of Social Gatherings with Friends and Family: Three times a week     Active Member of Clubs or Organizations: No     Attends Club or Organization Meetings: Patient declined     Marital Status:    Housing Stability: Low Risk  (4/2/2024)    Housing Stability Vital Sign     Unable to Pay for Housing in the Last Year: No     Number of Places Lived in the Last Year: 1     Unstable Housing in the Last Year: No     Family History   Problem Relation Age of Onset    Diabetes Mother     Hyperlipidemia Mother     Hypertension Mother     Lymphoma Mother     Hypertension Father     Cancer Father     Diabetes Father     Hyperlipidemia Father     No Known Problems Sister     No Known Problems Brother         Allergies  Review of patient's allergies indicates:  No Known Allergies    Review of Systems   Review of Systems    Constitutional: Negative for fever.   HENT:  Negative for nosebleeds.    Eyes:  Negative for visual disturbance.   Cardiovascular:  Negative for chest pain, claudication, dyspnea on exertion, palpitations and syncope.   Respiratory:  Negative for cough, hemoptysis and wheezing.    Endocrine: Negative for cold intolerance, heat intolerance, polyphagia and polyuria.   Hematologic/Lymphatic: Negative for bleeding problem.   Skin:  Negative for rash.   Musculoskeletal:  Negative for myalgias.   Gastrointestinal:  Negative for hematemesis, hematochezia, nausea and vomiting.   Genitourinary:  Negative for dysuria.   Neurological:  Negative for focal weakness and sensory change.   Psychiatric/Behavioral:  Negative for altered mental status.        Physical Exam  Vitals:    04/02/24 1429   BP: (!) 144/92   Pulse: 80     Wt Readings from Last 1 Encounters:   04/02/24 121.3 kg (267 lb 8.5 oz)     Physical Exam  HENT:      Head: Normocephalic and atraumatic.      Mouth/Throat:      Mouth: Mucous membranes are moist.   Eyes:      Extraocular Movements: Extraocular movements intact.      Pupils: Pupils are equal, round, and reactive to light.   Neck:      Vascular: No carotid bruit or JVD.   Cardiovascular:      Rate and Rhythm: Normal rate and regular rhythm.      Pulses: Normal pulses and intact distal pulses.      Heart sounds: Normal heart sounds. No murmur heard.     No friction rub. No gallop.   Pulmonary:      Effort: Pulmonary effort is normal.      Breath sounds: Normal breath sounds.   Abdominal:      Tenderness: There is no abdominal tenderness. There is no guarding or rebound.   Musculoskeletal:      Right lower leg: No edema.      Left lower leg: No edema.   Skin:     General: Skin is warm and dry.      Capillary Refill: Capillary refill takes less than 2 seconds.   Neurological:      General: No focal deficit present.      Mental Status: He is alert and oriented to person, place, and time.   Psychiatric:          Mood and Affect: Mood normal.         Labs  Lab Visit on 11/22/2023   Component Date Value Ref Range Status    Sodium 11/22/2023 138  136 - 145 mmol/L Final    Potassium 11/22/2023 4.9  3.5 - 5.1 mmol/L Final    Chloride 11/22/2023 106  95 - 110 mmol/L Final    CO2 11/22/2023 23  23 - 29 mmol/L Final    Glucose 11/22/2023 94  70 - 110 mg/dL Final    BUN 11/22/2023 19  6 - 20 mg/dL Final    Creatinine 11/22/2023 1.1  0.5 - 1.4 mg/dL Final    Calcium 11/22/2023 9.3  8.7 - 10.5 mg/dL Final    Anion Gap 11/22/2023 9  8 - 16 mmol/L Final    eGFR 11/22/2023 >60.0  >60 mL/min/1.73 m^2 Final   Hospital Outpatient Visit on 11/03/2023   Component Date Value Ref Range Status    Event Monitor Day 11/03/2023 2   Final    holter length minutes 11/03/2023 59   Final    Holter length hours 11/03/2023 47   Final    holter length dec hours 11/03/2023 95.98   Final    Sinus min HR 11/03/2023 45   Final    Sinus max hr 11/03/2023 141   Final    Sinus avg hr 11/03/2023 94   Final   Hospital Outpatient Visit on 11/03/2023   Component Date Value Ref Range Status    BSA 11/03/2023 2.46  m2 Final    LVOT stroke volume 11/03/2023 53.28  cm3 Final    LVIDd 11/03/2023 4.77  3.5 - 6.0 cm Final    LV Systolic Volume 11/03/2023 25.80  mL Final    LV Systolic Volume Index 11/03/2023 10.9  mL/m2 Final    LVIDs 11/03/2023 2.65  2.1 - 4.0 cm Final    LV Diastolic Volume 11/03/2023 106.19  mL Final    LV Diastolic Volume Index 11/03/2023 44.81  mL/m2 Final    IVS 11/03/2023 1.1  0.6 - 1.1 cm Final    LVOT diameter 11/03/2023 1.89  cm Final    LVOT area 11/03/2023 2.8  cm2 Final    FS 11/03/2023 44  28 - 44 % Final    Left Ventricle Relative Wall Thick* 11/03/2023 0.46  cm Final    Posterior Wall 11/03/2023 1.10  0.6 - 1.1 cm Final    LV mass 11/03/2023 192.02  g Final    LV Mass Index 11/03/2023 81  g/m2 Final    MV Peak E Dl 11/03/2023 0.52  m/s Final    TDI LATERAL 11/03/2023 0.13  m/s Final    TDI SEPTAL 11/03/2023 0.08  m/s Final    E/E' ratio  11/03/2023 4.95  m/s Final    MV Peak A Dl 11/03/2023 0.70  m/s Final    TR Max Dl 11/03/2023 2.41  m/s Final    E/A ratio 11/03/2023 0.74   Final    E wave deceleration time 11/03/2023 166.85  msec Final    LV SEPTAL E/E' RATIO 11/03/2023 6.50  m/s Final    LV LATERAL E/E' RATIO 11/03/2023 4.00  m/s Final    LVOT peak dl 11/03/2023 1.02  m/s Final    Left Ventricular Outflow Tract Svitlana* 11/03/2023 0.70  cm/s Final    Left Ventricular Outflow Tract Svitlana* 11/03/2023 2.27  mmHg Final    RVDD 11/03/2023 2.69  cm Final    Left Atrium Minor Axis 11/03/2023 2.52  cm Final    Left Atrium Major Axis 11/03/2023 4.85  cm Final    AV peak gradient 11/03/2023 7  mmHg Final    Ao peak dl 11/03/2023 1.28  m/s Final    LVOT peak VTI 11/03/2023 19.00  cm Final    AV Velocity Ratio 11/03/2023 0.80   Final    LEIGHTON by Velocity Ratio 11/03/2023 2.23  cm² Final    MV stenosis pressure 1/2 time 11/03/2023 48.39  ms Final    MV valve area p 1/2 method 11/03/2023 4.55  cm2 Final    Triscuspid Valve Regurgitation Pea* 11/03/2023 23  mmHg Final    PV PEAK VELOCITY 11/03/2023 1.06  m/s Final    PV peak gradient 11/03/2023 4  mmHg Final    Ao root annulus 11/03/2023 3.66  cm Final    IVC diameter 11/03/2023 1.60  cm Final    Mean e' 11/03/2023 0.11  m/s Final    ZLVIDS 11/03/2023 -6.65   Final    ZLVIDD 11/03/2023 -7.65   Final    AORTIC VALVE CUSP SEPERATION 11/03/2023 1.88  cm Final    TV resting pulmonary artery pressu* 11/03/2023 26  mmHg Final    RV TB RVSP 11/03/2023 5  mmHg Final    Est. RA pres 11/03/2023 3  mmHg Final    EF 11/03/2023 65  % Final   Office Visit on 10/25/2023   Component Date Value Ref Range Status    85% Max Predicted HR 11/03/2023 152   Final    Max Predicted HR 11/03/2023 179   Final    [facility]HS CV CPX PATIENT IS MALE 11/03/2023 1.0   Final    [facility] CV CPX PATIENT IS FEMALE 11/03/2023 0.0   Final    Systolic blood pressure 11/03/2023 173  mmHg Final    Diastolic blood pressure 11/03/2023 103  mmHg Final    HR at rest  11/03/2023 117  bpm Final    Exercise duration (min) 11/03/2023 8  minutes Final    Exercise duration (sec) 11/03/2023 0  seconds Final    Peak Systolic BP 11/03/2023 244  mmHg Final    Peak Diatolic BP 11/03/2023 78  mmHg Final    Peak HR 11/03/2023 160  bpm Final    Estimated METs 11/03/2023 10   Final    % Max HR Achieved 11/03/2023 89   Final    1 Minute Recovery HR 11/03/2023 150  bpm Final    RPP 11/03/2023 20,241   Final    Peak RPP 11/03/2023 39,040   Final       EKG  As above    Echo   Results for orders placed or performed during the hospital encounter of 11/03/23   Echo   Result Value Ref Range    BSA 2.46 m2    LVOT stroke volume 53.28 cm3    LVIDd 4.77 3.5 - 6.0 cm    LV Systolic Volume 25.80 mL    LV Systolic Volume Index 10.9 mL/m2    LVIDs 2.65 2.1 - 4.0 cm    LV Diastolic Volume 106.19 mL    LV Diastolic Volume Index 44.81 mL/m2    IVS 1.1 0.6 - 1.1 cm    LVOT diameter 1.89 cm    LVOT area 2.8 cm2    FS 44 28 - 44 %    Left Ventricle Relative Wall Thickness 0.46 cm    Posterior Wall 1.10 0.6 - 1.1 cm    LV mass 192.02 g    LV Mass Index 81 g/m2    MV Peak E Dl 0.52 m/s    TDI LATERAL 0.13 m/s    TDI SEPTAL 0.08 m/s    E/E' ratio 4.95 m/s    MV Peak A Dl 0.70 m/s    TR Max Dl 2.41 m/s    E/A ratio 0.74     E wave deceleration time 166.85 msec    LV SEPTAL E/E' RATIO 6.50 m/s    LV LATERAL E/E' RATIO 4.00 m/s    LVOT peak dl 1.02 m/s    Left Ventricular Outflow Tract Mean Velocity 0.70 cm/s    Left Ventricular Outflow Tract Mean Gradient 2.27 mmHg    RVDD 2.69 cm    Left Atrium Minor Axis 2.52 cm    Left Atrium Major Axis 4.85 cm    AV peak gradient 7 mmHg    Ao peak dl 1.28 m/s    LVOT peak VTI 19.00 cm    AV Velocity Ratio 0.80     LE[place]ON by Velocity Ratio 2.23 cm²    MV stenosis pressure 1/2 time 48.39 ms    MV valve area p 1/2 method 4.55 cm2    Triscuspid Valve Regurgitation Peak Gradient 23 mmHg    PV PEAK VELOCITY 1.06 m/s    PV peak gradient 4 mmHg    Ao root annulus 3.66 cm    IVC diameter  1.60 cm    Mean e' 0.11 m/s    ZLVIDS -6.65     ZLVIDD -7.65     AORTIC VALVE CUSP SEPERATION 1.88 cm    TV resting pulmonary artery pressure 26 mmHg    RV TB RVSP 5 mmHg    Est. RA pres 3 mmHg    EF 65 %    Narrative      Left Ventricle: The left ventricle is normal in size. Normal wall   thickness. Normal wall motion. There is normal systolic function. Ejection   fraction by visual approximation is 65%. There is normal diastolic   function.    Right Ventricle: Normal right ventricular cavity size. Wall thickness   is normal. Right ventricle wall motion  is normal. Systolic function is   normal.    Mitral Valve: There is no stenosis.    Pulmonic Valve: There is no stenosis.    IVC/SVC: Normal venous pressure at 3 mmHg.         Imaging  No results found.    Prior coronary angiogram / intervention:  No prior    Assessment and Plan  1. Mixed hyperlipidemia  Had atorvastatin  - atorvastatin (LIPITOR) 20 MG tablet; Take 1 tablet (20 mg total) by mouth once daily.  Dispense: 90 tablet; Refill: 3    2. Labile blood pressure  Blood pressure log    3. Essential hypertension, benign  Stop eplerenone start Coreg at low dose continue Benicar may need to titrate Coreg  Keep blood pressure log call with any concerns  - carvediloL (COREG) 3.125 MG tablet; Take 1 tablet (3.125 mg total) by mouth 2 (two) times daily with meals.  Dispense: 180 tablet; Refill: 1    4. Dyspnea on exertion  Not currently an issue        Follow Up  Follow up in about 3 months (around 7/2/2024).      Jeremy Grimaldo MD, [facility], [facility]  Interventional Cardiology     Total professional time spent for the encounter: 45 minutes  Time was spent preparing to see the patient, reviewing results of prior testing, obtaining and/or reviewing separately obtained history, performing a medically appropriate examination and interview, counseling and educating the patient/family, ordering medications/tests/procedures, referring and communicating with other health care  professionals, documenting clinical information in the electronic health record, and independently interpreting results.

## 2024-05-31 ENCOUNTER — OFFICE VISIT (OUTPATIENT)
Dept: DERMATOLOGY | Facility: CLINIC | Age: 44
End: 2024-05-31
Payer: COMMERCIAL

## 2024-05-31 DIAGNOSIS — Z12.83 SKIN EXAM, SCREENING FOR CANCER: ICD-10-CM

## 2024-05-31 DIAGNOSIS — D22.9 MULTIPLE BENIGN NEVI: Primary | ICD-10-CM

## 2024-05-31 DIAGNOSIS — L98.9 SKIN LESION: ICD-10-CM

## 2024-05-31 DIAGNOSIS — D22.9 BENIGN MOLE: ICD-10-CM

## 2024-05-31 DIAGNOSIS — D17.9 LIPOMA, UNSPECIFIED SITE: ICD-10-CM

## 2024-05-31 DIAGNOSIS — L30.9 HAND ECZEMA: ICD-10-CM

## 2024-05-31 PROCEDURE — 99204 OFFICE O/P NEW MOD 45 MIN: CPT | Mod: S$GLB,,, | Performed by: DERMATOLOGY

## 2024-05-31 PROCEDURE — 1159F MED LIST DOCD IN RCRD: CPT | Mod: CPTII,S$GLB,, | Performed by: DERMATOLOGY

## 2024-05-31 PROCEDURE — 99999 PR PBB SHADOW E&M-EST. PATIENT-LVL III: CPT | Mod: PBBFAC,,, | Performed by: DERMATOLOGY

## 2024-05-31 PROCEDURE — 1160F RVW MEDS BY RX/DR IN RCRD: CPT | Mod: CPTII,S$GLB,, | Performed by: DERMATOLOGY

## 2024-05-31 PROCEDURE — 4010F ACE/ARB THERAPY RXD/TAKEN: CPT | Mod: CPTII,S$GLB,, | Performed by: DERMATOLOGY

## 2024-05-31 PROCEDURE — G2211 COMPLEX E/M VISIT ADD ON: HCPCS | Mod: S$GLB,,, | Performed by: DERMATOLOGY

## 2024-05-31 RX ORDER — CLOBETASOL PROPIONATE 0.5 MG/G
CREAM TOPICAL 2 TIMES DAILY
Qty: 45 G | Refills: 2 | Status: SHIPPED | OUTPATIENT
Start: 2024-05-31

## 2024-05-31 NOTE — PROGRESS NOTES
Subjective:      Patient ID:  Jeremy Fried is a 44 y.o. male who presents for   Chief Complaint   Patient presents with    Cyst     Neck     Skin Check     Patient here for TBSE Body Skin Exam    Last seen by dermatologist: over 20yrs ago    no - personal history of atypical moles removed  no - personal history of MM   no - family history of MM  yes - childhood blistering sunburns  yes - tanning bed use  no - personal history of NMSC        No new concerning moles or lesions        Cyst - Initial  Affected locations: neck  Duration: 3 years  Severity: mild to moderate  Timing: constant        Review of Systems   Constitutional: Negative.  Negative for fever and chills.   Respiratory:  Negative for cough and shortness of breath.    Gastrointestinal:  Negative for nausea and vomiting.   Musculoskeletal:  Negative for joint swelling and arthralgias.   Skin:  Negative for daily sunscreen use, activity-related sunscreen use, recent sunburn and wears hat.   All other systems reviewed and are negative.  Hematologic/Lymphatic: Does not bruise/bleed easily.       Objective:   Physical Exam   Constitutional: He appears well-developed and well-nourished. No distress.   Neurological: He is alert and oriented to person, place, and time. He is not disoriented.   Psychiatric: He has a normal mood and affect.   Skin:   Areas Examined (abnormalities noted in diagram):   Scalp / Hair Palpated and Inspected  Head / Face Inspection Performed  Neck Inspection Performed  Chest / Axilla Inspection Performed  Abdomen Inspection Performed  Back Inspection Performed  RUE Inspected  LUE Inspection Performed  RLE Inspected  LLE Inspection Performed  Nails and Digits Inspection Performed                     Diagram Legend     Erythematous scaling macule/papule c/w actinic keratosis       Vascular papule c/w angioma      Pigmented verrucoid papule/plaque c/w seborrheic keratosis      Yellow umbilicated papule c/w sebaceous hyperplasia       Irregularly shaped tan macule c/w lentigo     1-2 mm smooth white papules consistent with Milia      Movable subcutaneous cyst with punctum c/w epidermal inclusion cyst      Subcutaneous movable cyst c/w pilar cyst      Firm pink to brown papule c/w dermatofibroma      Pedunculated fleshy papule(s) c/w skin tag(s)      Evenly pigmented macule c/w junctional nevus     Mildly variegated pigmented, slightly irregular-bordered macule c/w mildly atypical nevus      Flesh colored to evenly pigmented papule c/w intradermal nevus       Pink pearly papule/plaque c/w basal cell carcinoma      Erythematous hyperkeratotic cursted plaque c/w SCC      Surgical scar with no sign of skin cancer recurrence      Open and closed comedones      Inflammatory papules and pustules      Verrucoid papule consistent consistent with wart     Erythematous eczematous patches and plaques     Dystrophic onycholytic nail with subungual debris c/w onychomycosis     Umbilicated papule    Erythematous-base heme-crusted tan verrucoid plaque consistent with inflamed seborrheic keratosis     Erythematous Silvery Scaling Plaque c/w Psoriasis     See annotation      L shin with flat lipoma approx 2 cm.    Assessment / Plan:        Multiple benign nevi  Discussed all of the following with the patient.    Patient to check their breasts (if applicable), buttocks, and groin with a mirror.  Patient deferred our examination of these areas today.    Each month, check your body for any spots such as freckles, age spots, and moles.  Watch for color changes and shape changes and growth in size.    Have your cm or  pay attention to any dark color changes to moles of the scalp.    Moles, also called nevi, are small, colored (pigmented) marks on the skin. They have no known purpose. Many moles appear before age 30, but they also increase frequently as people age. Moles most often are not cancer (benign) and are harmless. But some become cancerous  (malignant). Thats why you need to watch the moles on your body and tell your healthcare provider about any that concern you.  Brochure given for patient education.    Skin lesion  -     Ambulatory referral/consult to Dermatology  Previous [facility] labs and or records and notes reviewed and considered for their impact on our clinical decision making today.    Skin exam, screening for cancer  No other seriously suspicious lesions noted for body areas examined today.  Patient to inform of us if they notice any dark or changing or suspicious spots in areas not examined today.  Follow up for routine monitoring recommended to patient.    Instructed patient to watch out for dark spots, bleeding spots, crusty spots, sores that break out repeatedly in the same spot.  These characteristics are risk factors for skin cancer, and patient is to notify us if they experience any of these symptoms.    Benign mole  We will recheck the butt at our follow up appointment.  Patient to watch for any suspicious changes of skin or skin spots, including color changes, darkening, bleeding, scabbing, increase in size, pain, itch, or worsening.  Patient to come in for more evaluation if any such occurs.    Hand eczema  -     clobetasoL (TEMOVATE) 0.05 % cream; Apply topically 2 (two) times daily. Prn hand rash.Stop using steroid topical when skin is smooth and non itchy.  Do not treat dark or red coloring.  Dispense: 45 g; Refill: 2  Fingers.  Wash hands in cool to cold water with mild soap.  Only wash after using the restroom and dirty work.  Use plain hand  for germ prevention.  Reviewed with patient different treatment options and associated risks.  Proper application of medications and or care for affected area(s) and condition(s) reviewed.    Lipoma, unspecified site  Mild on l leg.  Cons il roids if any increase size there.    Neck:  Discussed with patient the risks of procedure or surgery, including scar, ulceration,  recurrence, skin discoloration, and infection.  Patient not to have alcohol, ibuprofen, nsaids day of and night before procedure.  No swimming pool or ocean water for one week after procedure.  No heavy exertion in general or physical activity that would stretch the surgical site for one week after the procedure.    +ho large lipoma r arm with ext surgery age 20.  Previous [facility] labs and or records and notes reviewed and considered for their impact on our clinical decision making today.             Follow up in about 1 year (around 5/31/2025) for TBSE.

## 2024-05-31 NOTE — PATIENT INSTRUCTIONS
Recommended more hand  than water washing for routine germ prevention.      Clobetasol as needed for hand eczema     Discussed with patient the risks of procedure or surgery, including scar, ulceration, recurrence, skin discoloration, and infection.   Patient not to have alcohol, ibuprofen, nsaids day of and night before procedure.   No swimming pool or ocean water for one week after procedure.   No heavy exertion in general or physical activity that would stretch the surgical site for one week after the procedure.      Discussed all of the following with the patient.    Patient to check their breasts (if applicable), buttocks, and groin with a mirror.  Patient deferred our examination of these areas today.    Each month, check your body for any spots such as freckles, age spots, and moles.  Watch for color changes and shape changes and growth in size.    Have your cm or  pay attention to any dark color changes to moles of the scalp.    Moles, also called nevi, are small, colored (pigmented) marks on the skin. They have no known purpose. Many moles appear before age 30, but they also increase frequently as people age. Moles most often are not cancer (benign) and are harmless. But some become cancerous (malignant). Thats why you need to watch the moles on your body and tell your healthcare provider about any that concern you.

## 2024-06-04 ENCOUNTER — TELEPHONE (OUTPATIENT)
Dept: DERMATOLOGY | Facility: CLINIC | Age: 44
End: 2024-06-04
Payer: COMMERCIAL

## 2024-06-04 NOTE — TELEPHONE ENCOUNTER
Spoke with [facility] pharmacy. Per  pt will apply 0.5grams        Lorena         ----- Message from Shaun Ryan MD sent at 6/4/2024 12:45 PM CDT -----  Regarding: RE: call back  Contact: [phone]  0.5 gram  ----- Message -----  From: Lorena Vigil MA  Sent: 5/31/2024   2:42 PM CDT  To: Shaun Ryan MD  Subject: FW: call back                                      ----- Message -----  From: Kaci Law  Sent: 5/31/2024  12:50 PM CDT  To: [facility] Dunn Staff  Subject: call back                                        Caller with [facility] calling in regarding medication clobetasoL (TEMOVATE) 0.05 % cream caller needing to know how many grams to apply  please call to discuss Further

## 2024-06-18 ENCOUNTER — PROCEDURE VISIT (OUTPATIENT)
Dept: DERMATOLOGY | Facility: CLINIC | Age: 44
End: 2024-06-18
Payer: COMMERCIAL

## 2024-06-18 DIAGNOSIS — D17.9 LIPOMA, UNSPECIFIED SITE: Primary | ICD-10-CM

## 2024-06-18 PROCEDURE — 88304 TISSUE EXAM BY PATHOLOGIST: CPT | Performed by: DERMATOLOGY

## 2024-06-18 NOTE — PATIENT INSTRUCTIONS
Post- Operative Wound Care    Your doctor has performed local skin surgery today.  Vaseline ointment and a pressure bandage were placed after the surgery.  It is very important that you keep this bandage in place for 24 hours.  This will decrease the risk of post - operative infection and bleeding.  After 24 hours, you may remove the band aid and wash the area with warm soap and water and apply Vaseline ointment.  Many patients prefer to use Neosporin or Bacitracin ointment.  This is acceptable; however know that you can develop an allergy to this medication even if you have used it safely for years.  It is important to keep the area moist.  Letting it dry out and get air slows healing time, will worsen the scar, and make it more difficult to remove the stitches.  Band aid is optional after first 24 hours.    It is best NOT to use hydrogen peroxide or antibacterial soap to wash the operative site.       If you notice increasing redness, tenderness, pain, or yellow drainage at the biopsy or surgical site, please notify your doctor.  These are signs of an infection.    If your biopsy/surgical site is bleeding, apply firm pressure for 15 minutes straight.  Repeat for another 15 minutes, if it is still bleeding.   If the surgical site continues to bleed, then please contact your doctor.      For [facility] users:   You will receive your pathology results in [facility] as soon as they are available. Please be assured that your physician/provider will review your results and contact you should additional treatment be required. This is one more way [facility] is putting you first.       [facility] [place] [address]/ [phone] FAX/ [email or web address]

## 2024-06-18 NOTE — PROGRESS NOTES
PROCEDURE: Elliptical excision with complex layered repair in order to decrease dead space, close large gap, maintain function of area, and preserve anatomical contour.    ANESTHETIC: 1 cc 1% Xylocaine with Epinephrine 1:100,000, buffered    SURGEON:  Shaun Ryan M.D.    ASSISTANTS: Tara Hagan LPN    PREOPERATIVE DIAGNOSIS:  lipoma    POSTOPERATIVE DIAGNOSIS: Same as preoperative diagnosis    PATHOLOGIC DIAGNOSIS: Pending    LOCATION: neck    INITIAL LESION SIZE: 2.2cm    EXCISED DIAMETER: 2.2 cm    PREPARATION: The diagnosis, procedure, alternatives, benefits and risks, including but not limited to: infection, bleeding/bruising, drug reactions, pain, scar or cosmetic defect, local sensation disturbances, wound dehiscence (separation of wound edges after sutures removed) and/or recurrence of present condition were explained to the patient. The patient elected to proceed.  Patient's identity was verified and the site was verified.    PROCEDURE: The location noted above was prepped, draped, and anesthetized in the usual sterile fashion per Tara Hagan LPN. Lesional tissue was carefully marked with at least 0 mm margins of clinically normal skin in all directions. A fusiform elliptical excision was done with #15 blade carried down completely through the dermis into the deep subcutaneous tissues to the level of the non-muscle fascia, and dissection was carried out in that plane. The wound was undermined to a distance at least the maximum width of the defect as measured perpendicular to the closure line along at least one entire edge of the defect, in the case 1 cm. Electrocoagulation was used to obtain hemostasis. Blood loss was minimal. The wound was then approximated in a layered fashion with subcutaneous and intradermal sutures of 3.0 Monocryl, approximately 3 in number, and the wound was then superficially closed with simple interrupted sutures of 3.0 Prolene.  Absorbable sutures used for outer sutures.      The patient tolerated the procedure well.    The area was cleaned and dressed appropriately and the patient was given wound care instructions, as well as an appointment for follow-up evaluation.    LENGTH OF REPAIR: 1.2 cm

## 2024-06-21 LAB
FINAL PATHOLOGIC DIAGNOSIS: NORMAL
GROSS: NORMAL
Lab: NORMAL
MICROSCOPIC EXAM: NORMAL

## 2024-07-10 ENCOUNTER — OFFICE VISIT (OUTPATIENT)
Dept: CARDIOLOGY | Facility: CLINIC | Age: 44
End: 2024-07-10
Payer: COMMERCIAL

## 2024-07-10 VITALS
SYSTOLIC BLOOD PRESSURE: 175 MMHG | WEIGHT: 269.63 LBS | HEART RATE: 76 BPM | OXYGEN SATURATION: 95 % | BODY MASS INDEX: 38.6 KG/M2 | DIASTOLIC BLOOD PRESSURE: 115 MMHG | HEIGHT: 70 IN

## 2024-07-10 DIAGNOSIS — E78.2 MIXED HYPERLIPIDEMIA: Primary | ICD-10-CM

## 2024-07-10 DIAGNOSIS — R06.09 DYSPNEA ON EXERTION: ICD-10-CM

## 2024-07-10 DIAGNOSIS — R09.89 LABILE BLOOD PRESSURE: ICD-10-CM

## 2024-07-10 DIAGNOSIS — I10 ESSENTIAL HYPERTENSION, BENIGN: ICD-10-CM

## 2024-07-10 PROCEDURE — 3080F DIAST BP >= 90 MM HG: CPT | Mod: CPTII,S$GLB,, | Performed by: INTERNAL MEDICINE

## 2024-07-10 PROCEDURE — 99214 OFFICE O/P EST MOD 30 MIN: CPT | Mod: S$GLB,,, | Performed by: INTERNAL MEDICINE

## 2024-07-10 PROCEDURE — 1159F MED LIST DOCD IN RCRD: CPT | Mod: CPTII,S$GLB,, | Performed by: INTERNAL MEDICINE

## 2024-07-10 PROCEDURE — 4010F ACE/ARB THERAPY RXD/TAKEN: CPT | Mod: CPTII,S$GLB,, | Performed by: INTERNAL MEDICINE

## 2024-07-10 PROCEDURE — 1160F RVW MEDS BY RX/DR IN RCRD: CPT | Mod: CPTII,S$GLB,, | Performed by: INTERNAL MEDICINE

## 2024-07-10 PROCEDURE — 3077F SYST BP >= 140 MM HG: CPT | Mod: CPTII,S$GLB,, | Performed by: INTERNAL MEDICINE

## 2024-07-10 PROCEDURE — 99999 PR PBB SHADOW E&M-EST. PATIENT-LVL III: CPT | Mod: PBBFAC,,, | Performed by: INTERNAL MEDICINE

## 2024-07-10 PROCEDURE — 3008F BODY MASS INDEX DOCD: CPT | Mod: CPTII,S$GLB,, | Performed by: INTERNAL MEDICINE

## 2024-07-10 RX ORDER — CARVEDILOL 6.25 MG/1
6.25 TABLET ORAL 2 TIMES DAILY WITH MEALS
Qty: 180 TABLET | Refills: 3 | Status: SHIPPED | OUTPATIENT
Start: 2024-07-10 | End: 2025-07-10

## 2024-07-10 NOTE — PROGRESS NOTES
[id number]  Cardiology Clinic Note      Chief Complaint  Chief Complaint   Patient presents with    Follow-up       HPI:      44-year-old male past medical history thyroid disease, syncope, tobacco use, hypertension, hyperlipidemia, Holter 11/2023 no significant abnormalities, exercise stress EKG negative for ischemia 11/2023, echo 11/2023 normal EF normal diastolic function normal RV normal CVP    Last visit stopped eplerenone started Coreg and continued Benicar  Hypertensive today remains hypertensive after repeat  The patient states that when he did take his blood pressure at home it was in the 140s systolic    EKG 10/2023 normal sinus rhythm nonspecific ST-T changes    Medications  Current Outpatient Medications   Medication Sig Dispense Refill    atorvastatin (LIPITOR) 20 MG tablet Take 1 tablet (20 mg total) by mouth once daily. 90 tablet 3    carvediloL (COREG) 3.125 MG tablet Take 1 tablet (3.125 mg total) by mouth 2 (two) times daily with meals. 180 tablet 1    olmesartan (BENICAR) 40 MG tablet Take 1 tablet (40 mg total) by mouth once daily. 90 tablet 3    clobetasoL (TEMOVATE) 0.05 % cream Apply topically 2 (two) times daily. Prn hand rash.Stop using steroid topical when skin is smooth and non itchy.  Do not treat dark or red coloring. 45 g 2     No current facility-administered medications for this visit.        History  Past Medical History:   Diagnosis Date    Gout, unspecified     Hypertension     Mixed hyperlipidemia 10/25/2023    Syncope and collapse     Thyroid disease      Past Surgical History:   Procedure Laterality Date    LIPOMA RESECTION  2000    Right shoulder lipoma removed     Social History     Socioeconomic History    Marital status:    Occupational History    Occupation: swimming pool repair   Tobacco Use    Smoking status: Every Day     Current packs/day: 1.00     Average packs/day: 1 pack/day for 25.5 years (25.5 ttl pk-yrs)     Types: Cigarettes     Start  date: 1999    Tobacco comments:     has tried wellbutrin, cutting back , patches, and gum   Substance and Sexual Activity    Alcohol use: Yes     Alcohol/week: 18.0 standard drinks of alcohol     Types: 3 Cans of beer, 15 Shots of liquor per week    Drug use: No    Sexual activity: Yes     Partners: Male     Birth control/protection: None     Social Determinants of Health     Financial Resource Strain: Low Risk  (4/2/2024)    Overall Financial Resource Strain (CARDIA)     Difficulty of Paying Living Expenses: Not hard at all   Food Insecurity: No Food Insecurity (4/2/2024)    Hunger Vital Sign     Worried About Running Out of Food in the Last Year: Never true     Ran Out of Food in the Last Year: Never true   Transportation Needs: No Transportation Needs (4/2/2024)    PRAPARE - Transportation     Lack of Transportation (Medical): No     Lack of Transportation (Non-Medical): No   Physical Activity: Insufficiently Active (4/2/2024)    Exercise Vital Sign     Days of Exercise per Week: 1 day     Minutes of Exercise per Session: 10 min   Stress: Stress Concern Present (4/2/2024)    [employer] - Occupational Stress Questionnaire     Feeling of Stress : Rather much   Housing Stability: Low Risk  (4/2/2024)    Housing Stability Vital Sign     Unable to Pay for Housing in the Last Year: No     Number of Places Lived in the Last Year: 1     Unstable Housing in the Last Year: No     Family History   Problem Relation Name Age of Onset    Diabetes Mother      Hyperlipidemia Mother      Hypertension Mother      Lymphoma Mother      Hypertension Father      Cancer Father      Diabetes Father      Hyperlipidemia Father      No Known Problems Sister      No Known Problems Brother          Allergies  Review of patient's allergies indicates:  No Known Allergies    Review of Systems   Review of Systems   Constitutional: Negative for fever.   HENT:  Negative for nosebleeds.    Eyes:  Negative for visual  disturbance.   Cardiovascular:  Negative for chest pain, claudication, dyspnea on exertion, palpitations and syncope.   Respiratory:  Negative for cough, hemoptysis and wheezing.    Endocrine: Negative for cold intolerance, heat intolerance, polyphagia and polyuria.   Hematologic/Lymphatic: Negative for bleeding problem.   Skin:  Negative for rash.   Musculoskeletal:  Negative for myalgias.   Gastrointestinal:  Negative for hematemesis, hematochezia, nausea and vomiting.   Genitourinary:  Negative for dysuria.   Neurological:  Negative for focal weakness and sensory change.   Psychiatric/Behavioral:  Negative for altered mental status.        Physical Exam  Vitals:    07/10/24 1000   Pulse: 76     Wt Readings from Last 1 Encounters:   07/10/24 122.3 kg (269 lb 10 oz)     Physical Exam  HENT:      Head: Normocephalic and atraumatic.      Mouth/Throat:      Mouth: Mucous membranes are moist.   Eyes:      Extraocular Movements: Extraocular movements intact.      Pupils: Pupils are equal, round, and reactive to light.   Neck:      Vascular: No carotid bruit or JVD.   Cardiovascular:      Rate and Rhythm: Normal rate and regular rhythm.      Pulses: Normal pulses and intact distal pulses.      Heart sounds: Normal heart sounds. No murmur heard.     No friction rub. No gallop.   Pulmonary:      Effort: Pulmonary effort is normal.      Breath sounds: Normal breath sounds.   Abdominal:      Tenderness: There is no abdominal tenderness. There is no guarding or rebound.   Musculoskeletal:      Right lower leg: No edema.      Left lower leg: No edema.   Skin:     General: Skin is warm and dry.      Capillary Refill: Capillary refill takes less than 2 seconds.   Neurological:      General: No focal deficit present.      Mental Status: He is alert and oriented to person, place, and time.   Psychiatric:         Mood and Affect: Mood normal.         Labs  Procedure visit on 06/18/2024   Component Date Value Ref Range Status     Final Pathologic Diagnosis 06/18/2024    Final                    Value:1. Skin, neck, excision:    - LIPOMA    This lesion is benign.        Interp By Luz Marina Barba MD., Signed on 06/21/2024 at 15:52    Gross 06/18/2024    Final                    Value:One Part Case:    Part 1  Pathology ID# [id number]  Pathology MRN # [id number]  Hospital/Clinic label MRN# [id number]    Received in formalin labeled with the patient's name, MRN, and &quot;neck&quot; is a 2.6 x 2.0 x 1.4 cm tan-yellow, lobulated portion of fibrofatty adipose tissue, partially surfaced by a 0.6 x 0.5 cm tan-white, smooth, grossly unremarkable skin   fragment.  The margin of resection is inked blue, the specimen is serially sectioned to reveal tan-yellow, smooth, grossly unremarkable cut surfaces.  Representative sections are submitted in cassette [id number].    LETY Womack PA ([facility])CM      Microscopic Exam 06/18/2024 1. Sections show a deep dermal tumor composed of a lobular proliferation of mature adipose tissue with thin fibrous septa.   Final    Disclaimer 06/18/2024 Unless the case is a 'gross only' or additional testing only, the final diagnosis for each specimen is based on a microscopic examination of appropriate tissue sections.   Final       EKG  As above    Echo   Results for orders placed or performed during the hospital encounter of 11/03/23   Echo   Result Value Ref Range    BSA 2.46 m2    LVOT stroke volume 53.28 cm3    LVIDd 4.77 3.5 - 6.0 cm    LV Systolic Volume 25.80 mL    LV Systolic Volume Index 10.9 mL/m2    LVIDs 2.65 2.1 - 4.0 cm    LV Diastolic Volume 106.19 mL    LV Diastolic Volume Index 44.81 mL/m2    IVS 1.1 0.6 - 1.1 cm    LVOT diameter 1.89 cm    LVOT area 2.8 cm2    FS 44 28 - 44 %    Left Ventricle Relative Wall Thickness 0.46 cm    Posterior Wall 1.10 0.6 - 1.1 cm    LV mass 192.02 g    LV Mass Index 81 g/m2    MV Peak E Dl 0.52 m/s    TDI LATERAL 0.13 m/s    TDI SEPTAL 0.08 m/s    E/E' ratio 4.95 m/s    MV  Peak A Dl 0.70 m/s    TR Max Dl 2.41 m/s    E/A ratio 0.74     E wave deceleration time 166.85 msec    LV SEPTAL E/E' RATIO 6.50 m/s    LV LATERAL E/E' RATIO 4.00 m/s    LVOT peak dl 1.02 m/s    Left Ventricular Outflow Tract Mean Velocity 0.70 cm/s    Left Ventricular Outflow Tract Mean Gradient 2.27 mmHg    RVDD 2.69 cm    Left Atrium Minor Axis 2.52 cm    Left Atrium Major Axis 4.85 cm    AV peak gradient 7 mmHg    Ao peak dl 1.28 m/s    LVOT peak VTI 19.00 cm    AV Velocity Ratio 0.80     LEIGHTON by Velocity Ratio 2.23 cm²    MV stenosis pressure 1/2 time 48.39 ms    MV valve area p 1/2 method 4.55 cm2    Triscuspid Valve Regurgitation Peak Gradient 23 mmHg    PV PEAK VELOCITY 1.06 m/s    PV peak gradient 4 mmHg    Ao root annulus 3.66 cm    IVC diameter 1.60 cm    Mean e' 0.11 m/s    ZLVIDS -6.65     ZLVIDD -7.65     AORTIC VALVE CUSP SEPERATION 1.88 cm    TV resting pulmonary artery pressure 26 mmHg    RV TB RVSP 5 mmHg    Est. RA pres 3 mmHg    EF 65 %    Narrative      Left Ventricle: The left ventricle is normal in size. Normal wall   thickness. Normal wall motion. There is normal systolic function. Ejection   fraction by visual approximation is 65%. There is normal diastolic   function.    Right Ventricle: Normal right ventricular cavity size. Wall thickness   is normal. Right ventricle wall motion  is normal. Systolic function is   normal.    Mitral Valve: There is no stenosis.    Pulmonic Valve: There is no stenosis.    IVC/SVC: Normal venous pressure at 3 mmHg.         Imaging  No results found.    Prior coronary angiogram / intervention:  No prior    Assessment and Plan  1. Mixed hyperlipidemia  Atorvastatin    2. Labile blood pressure  Blood pressure log    3. Essential hypertension, benign  Continue Coreg and Benicar  Increase Coreg to 6.25 b.i.d.  Blood pressure log at home; send values in 1 week    4. Dyspnea on exertion  Not currently an issue        Follow Up  Three months      Jeremy Grimaldo MD,  FACC, [facility]  Interventional Cardiology     Total professional time spent for the encounter: 30 minutes  Time was spent preparing to see the patient, reviewing results of prior testing, obtaining and/or reviewing separately obtained history, performing a medically appropriate examination and interview, counseling and educating the patient/family, ordering medications/tests/procedures, referring and communicating with other health care professionals, documenting clinical information in the electronic health record, and independently interpreting results.

## 2024-09-19 ENCOUNTER — PATIENT MESSAGE (OUTPATIENT)
Dept: PRIMARY CARE CLINIC | Facility: CLINIC | Age: 44
End: 2024-09-19
Payer: COMMERCIAL

## 2024-09-30 RX ORDER — OLMESARTAN MEDOXOMIL 40 MG/1
40 TABLET ORAL DAILY
Qty: 90 TABLET | Refills: 0 | Status: SHIPPED | OUTPATIENT
Start: 2024-09-30 | End: 2024-12-29

## 2024-10-16 ENCOUNTER — PATIENT MESSAGE (OUTPATIENT)
Dept: ADMINISTRATIVE | Facility: HOSPITAL | Age: 44
End: 2024-10-16
Payer: COMMERCIAL

## 2024-11-05 ENCOUNTER — TELEPHONE (OUTPATIENT)
Dept: PHARMACY | Facility: CLINIC | Age: 44
End: 2024-11-05
Payer: COMMERCIAL

## 2024-11-05 NOTE — TELEPHONE ENCOUNTER
[facility]/Population Health Chart Review & Patient Outreach Details For Medication Adherence Project    Reason for Outreach Encounter: 3rd Party payor non-compliance report ([employer], [employer], [employer], etc)  2.  Patient Outreach Method: Reviewed Patient Chart  3.   Medication in question: olmesartan   LAST FILLED: 9/30/24 for 90 day supply  Hypertension Medications               carvediloL (COREG) 6.25 MG tablet Take 1 tablet (6.25 mg total) by mouth 2 (two) times daily with meals.    olmesartan (BENICAR) 40 MG tablet Take 1 tablet (40 mg total) by mouth once daily.              4.  Reviewed and or Updates Made To: Patient Chart  5. Outreach Outcomes and/or actions taken: Patient filled medication and is on track to be adherent

## 2024-11-15 ENCOUNTER — OFFICE VISIT (OUTPATIENT)
Dept: CARDIOLOGY | Facility: CLINIC | Age: 44
End: 2024-11-15
Payer: COMMERCIAL

## 2024-11-15 VITALS
BODY MASS INDEX: 39.5 KG/M2 | OXYGEN SATURATION: 96 % | DIASTOLIC BLOOD PRESSURE: 108 MMHG | SYSTOLIC BLOOD PRESSURE: 162 MMHG | WEIGHT: 275.88 LBS | HEART RATE: 86 BPM | HEIGHT: 70 IN

## 2024-11-15 DIAGNOSIS — I10 ESSENTIAL HYPERTENSION, BENIGN: Primary | ICD-10-CM

## 2024-11-15 DIAGNOSIS — E66.01 SEVERE OBESITY (BMI 35.0-39.9) WITH COMORBIDITY: ICD-10-CM

## 2024-11-15 DIAGNOSIS — R55 SYNCOPE AND COLLAPSE: ICD-10-CM

## 2024-11-15 DIAGNOSIS — F17.210 CIGARETTE NICOTINE DEPENDENCE WITHOUT COMPLICATION: ICD-10-CM

## 2024-11-15 DIAGNOSIS — E78.2 MIXED HYPERLIPIDEMIA: ICD-10-CM

## 2024-11-15 PROCEDURE — 99999 PR PBB SHADOW E&M-EST. PATIENT-LVL III: CPT | Mod: PBBFAC,,,

## 2024-11-15 RX ORDER — ATORVASTATIN CALCIUM 40 MG/1
40 TABLET, FILM COATED ORAL DAILY
Qty: 90 TABLET | Refills: 3 | Status: SHIPPED | OUTPATIENT
Start: 2024-11-15 | End: 2025-11-15

## 2024-11-15 RX ORDER — AMLODIPINE BESYLATE 5 MG/1
5 TABLET ORAL DAILY
Qty: 90 TABLET | Refills: 3 | Status: SHIPPED | OUTPATIENT
Start: 2024-11-15 | End: 2025-11-15

## 2024-11-15 NOTE — PROGRESS NOTES
[facility] - Cardiology [place] [id number]  Cardiology Clinic Note      Chief Complaint  Chief Complaint   Patient presents with    Follow-up       HPI:  Mr. Fried is a 44-year-old male past medical history thyroid disease, syncope, tobacco use, hypertension, hyperlipidemia, Holter 11/2023 no significant abnormalities, exercise stress EKG negative for ischemia 11/2023, echo 11/2023 normal EF normal diastolic function normal RV normal CVP    Patient is new to me and here to establish care  Previously seen by Dr. Grimaldo  Reports consistent elevated blood pressure readings  He is currently taking olmesartan 40 mg and carvedilol 6.25 b.i.d.  He denies any headaches, blurry vision, dizziness, or near syncopal episodes  Does endorse tobacco use 1 pack per day for greater than 25 years  He does work outdoors and reports deficient oral hydration  Also reports unhealthy diet and weight gain  Denies chest pain, SOB, palpitations, lightheadedness  Denies cough, LE edema, orthopnea, or PND  Denies fever, chills, or NVD  Lives at home with spouse who offers a great deal of support with care  He is independent and active without any debilities    EKG 10/2023 normal sinus rhythm nonspecific ST-T changes    Medications  Current Outpatient Medications   Medication Sig Dispense Refill    carvediloL (COREG) 6.25 MG tablet Take 1 tablet (6.25 mg total) by mouth 2 (two) times daily with meals. 180 tablet 3    olmesartan (BENICAR) 40 MG tablet Take 1 tablet (40 mg total) by mouth once daily. 90 tablet 0    amLODIPine (NORVASC) 5 MG tablet Take 1 tablet (5 mg total) by mouth once daily. 90 tablet 3    atorvastatin (LIPITOR) 40 MG tablet Take 1 tablet (40 mg total) by mouth once daily. 90 tablet 3    clobetasoL (TEMOVATE) 0.05 % cream Apply topically 2 (two) times daily. Prn hand rash.Stop using steroid topical when skin is smooth and non itchy.  Do not treat dark or red coloring. 45 g 2     No current facility-administered medications for this  visit.        History  Past Medical History:   Diagnosis Date    Gout, unspecified     Hypertension     Mixed hyperlipidemia 10/25/2023    Syncope and collapse     Thyroid disease      Past Surgical History:   Procedure Laterality Date    LIPOMA RESECTION  2000    Right shoulder lipoma removed     Social History     Socioeconomic History    Marital status:    Occupational History    Occupation: swimming pool repair   Tobacco Use    Smoking status: Every Day     Current packs/day: 1.00     Average packs/day: 1 pack/day for 25.9 years (25.9 ttl pk-yrs)     Types: Cigarettes     Start date: 1999    Tobacco comments:     has tried wellbutrin, cutting back , patches, and gum   Substance and Sexual Activity    Alcohol use: Yes     Alcohol/week: 18.0 standard drinks of alcohol     Types: 3 Cans of beer, 15 Shots of liquor per week    Drug use: No    Sexual activity: Yes     Partners: Male     Birth control/protection: None     Social Drivers of Health     Financial Resource Strain: Low Risk  (4/2/2024)    Overall Financial Resource Strain (CARDIA)     Difficulty of Paying Living Expenses: Not hard at all   Food Insecurity: No Food Insecurity (4/2/2024)    Hunger Vital Sign     Worried About Running Out of Food in the Last Year: Never true     Ran Out of Food in the Last Year: Never true   Transportation Needs: No Transportation Needs (4/2/2024)    PRAPARE - Transportation     Lack of Transportation (Medical): No     Lack of Transportation (Non-Medical): No   Physical Activity: Insufficiently Active (4/2/2024)    Exercise Vital Sign     Days of Exercise per Week: 1 day     Minutes of Exercise per Session: 10 min   Stress: Stress Concern Present (4/2/2024)    [employer] - Occupational Stress Questionnaire     Feeling of Stress : Rather much   Housing Stability: Low Risk  (4/2/2024)    Housing Stability Vital Sign     Unable to Pay for Housing in the Last Year: No     Number of Places  Lived in the Last Year: 1     Unstable Housing in the Last Year: No     Family History   Problem Relation Name Age of Onset    Diabetes Mother      Hyperlipidemia Mother      Hypertension Mother      Lymphoma Mother      Hypertension Father      Cancer Father      Diabetes Father      Hyperlipidemia Father      No Known Problems Sister      No Known Problems Brother          Allergies  Review of patient's allergies indicates:  No Known Allergies    Review of Systems   Review of Systems   Constitutional: Negative for chills, decreased appetite, diaphoresis, fever, malaise/fatigue, weight gain and weight loss.   Eyes:  Negative for blurred vision.   Cardiovascular:  Negative for chest pain, claudication, dyspnea on exertion, irregular heartbeat, leg swelling, near-syncope, orthopnea, palpitations, paroxysmal nocturnal dyspnea and syncope.   Respiratory:  Negative for cough, shortness of breath, snoring, sputum production and wheezing.    Endocrine: Negative for cold intolerance, heat intolerance, polydipsia, polyphagia and polyuria.   Skin:  Negative for color change, dry skin, itching, nail changes and poor wound healing.   Musculoskeletal:  Negative for back pain, gout, joint pain and joint swelling.   Gastrointestinal:  Negative for bloating, abdominal pain, constipation, diarrhea, hematemesis, hematochezia, melena, nausea and vomiting.   Genitourinary:  Negative for dysuria and hematuria.   Neurological:  Negative for dizziness, headaches, light-headedness, numbness, paresthesias and weakness.   Psychiatric/Behavioral:  Negative for altered mental status, depression and memory loss.        Physical Exam  Vitals:    11/15/24 0904   BP: (!) 162/108   Pulse: 86     Wt Readings from Last 1 Encounters:   11/15/24 125.1 kg (275 lb 14.5 oz)     Physical Exam  HENT:      Head: Normocephalic and atraumatic.      Mouth/Throat:      Mouth: Mucous membranes are moist.   Eyes:      Extraocular Movements: Extraocular movements  intact.      Pupils: Pupils are equal, round, and reactive to light.   Neck:      Vascular: No carotid bruit.   Cardiovascular:      Heart sounds: No murmur heard.     No friction rub. No gallop.   Pulmonary:      Effort: Pulmonary effort is normal. No respiratory distress.   Abdominal:      General: Abdomen is flat.      Palpations: Abdomen is soft.   Musculoskeletal:      Right lower leg: No edema.      Left lower leg: No edema.   Skin:     General: Skin is warm.      Capillary Refill: Capillary refill takes less than 2 seconds.   Neurological:      General: No focal deficit present.      Mental Status: He is alert.   Psychiatric:         Mood and Affect: Mood normal.         Labs  Procedure visit on 06/18/2024   Component Date Value Ref Range Status    Final Pathologic Diagnosis 06/18/2024    Final                    Value:1. Skin, neck, excision:    - LIPOMA    This lesion is benign.        Interp By Luz Marina Barba MD., Signed on 06/21/2024 at 15:52    Gross 06/18/2024    Final                    Value:One Part Case:    Part 1  Pathology ID# [id number]  Pathology MRN # [id number]  Hospital/Clinic label MRN# [id number]    Received in formalin labeled with the patient's name, MRN, and &quot;neck&quot; is a 2.6 x 2.0 x 1.4 cm tan-yellow, lobulated portion of fibrofatty adipose tissue, partially surfaced by a 0.6 x 0.5 cm tan-white, smooth, grossly unremarkable skin   fragment.  The margin of resection is inked blue, the specimen is serially sectioned to reveal tan-yellow, smooth, grossly unremarkable cut surfaces.  Representative sections are submitted in cassette [id number].    LETY Womack PA (ASCP)CM      Microscopic Exam 06/18/2024 1. Sections show a deep dermal tumor composed of a lobular proliferation of mature adipose tissue with thin fibrous septa.   Final    Disclaimer 06/18/2024 Unless the case is a 'gross only' or additional testing only, the final diagnosis for each specimen is based on a  microscopic examination of appropriate tissue sections.   Final       EKG  Reviewed    Echo   Results for orders placed or performed during the hospital encounter of 11/03/23   Echo   Result Value Ref Range    BSA 2.46 m2    LVOT stroke volume 53.28 cm3    LVIDd 4.77 3.5 - 6.0 cm    LV Systolic Volume 25.80 mL    LV Systolic Volume Index 10.9 mL/m2    LVIDs 2.65 2.1 - 4.0 cm    LV Diastolic Volume 106.19 mL    LV Diastolic Volume Index 44.81 mL/m2    IVS 1.1 0.6 - 1.1 cm    LVOT diameter 1.89 cm    LVOT area 2.8 cm2    FS 44 28 - 44 %    Left Ventricle Relative Wall Thickness 0.46 cm    PW 1.10 0.6 - 1.1 cm    LV mass 192.02 g    LV Mass Index 81 g/m2    MV Peak E Dl 0.52 m/s    TDI LATERAL 0.13 m/s    TDI SEPTAL 0.08 m/s    E/E' ratio 4.95 m/s    MV Peak A Dl 0.70 m/s    TR Max Dl 2.41 m/s    E/A ratio 0.74     E wave deceleration time 166.85 msec    LV SEPTAL E/E' RATIO 6.50 m/s    LV LATERAL E/E' RATIO 4.00 m/s    LVOT peak dl 1.02 m/s    Left Ventricular Outflow Tract Mean Velocity 0.70 cm/s    Left Ventricular Outflow Tract Mean Gradient 2.27 mmHg    RVDD 2.69 cm    Left Atrium Minor Axis 2.52 cm    Left Atrium Major Axis 4.85 cm    AV peak gradient 7 mmHg    Ao peak dl 1.28 m/s    LVOT peak VTI 19.00 cm    AV Velocity Ratio 0.80     LEIGHTON by Velocity Ratio 2.23 cm²    MV stenosis pressure 1/2 time 48.39 ms    MV valve area p 1/2 method 4.55 cm2    Triscuspid Valve Regurgitation Peak Gradient 23 mmHg    PV PEAK VELOCITY 1.06 m/s    PV peak gradient 4 mmHg    Ao root annulus 3.66 cm    IVC diameter 1.60 cm    Mean e' 0.11 m/s    ZLVIDS -6.65     ZLVIDD -7.65     AORTIC VALVE CUSP SEPERATION 1.88 cm    TV resting pulmonary artery pressure 26 mmHg    RV TB RVSP 5 mmHg    Est. RA pres 3 mmHg    EF 65 %    Narrative      Left Ventricle: The left ventricle is normal in size. Normal wall   thickness. Normal wall motion. There is normal systolic function. Ejection   fraction by visual approximation is 65%. There is  normal diastolic   function.    Right Ventricle: Normal right ventricular cavity size. Wall thickness   is normal. Right ventricle wall motion  is normal. Systolic function is   normal.    Mitral Valve: There is no stenosis.    Pulmonic Valve: There is no stenosis.    IVC/SVC: Normal venous pressure at 3 mmHg.         Imaging  No results found.    Prior coronary angiogram / intervention:  No priors    Assessment and Plan  Mr. Fried is a 44-year-old male past medical history thyroid disease, syncope, tobacco use, hypertension, hyperlipidemia, Holter 11/2023 no significant abnormalities, exercise stress EKG negative for ischemia 11/2023, echo 11/2023 normal EF normal diastolic function normal RV normal CVP    Assessment and Plan  Mixed hyperlipidemia  Positive ASCVD risk score  Increased statin to 40 mg     Essential hypertension, benign  Remains hypertensive given adjustments with blood pressure medications  Will continue olmesartan 40 mg daily and carvedilol 6.25 b.i.d.  Added amlodipine 5 mg daily  Return for nurse visit for blood pressure and heart rate check    Severe obesity with comorbidities  Encouraged an increase in activities as tolerated for healthy weight management  Discussed Mediterranean Diet recommendations (Adopted from Gray et al, NE, 2018.)  - Eat primarily plant-based foods, such as fruits and vegetables, whole grains, legumes (beans) and nuts  - Limit refined carbohydrates (white pasta, bread, rice).  - Replace butter with healthy fats such as olive oil.  - Use herbs and spices instead of salt to flavor foods.  - Limit red meat and processed meats to no more than a few times a month.  - Avoid sugary sodas, bakery goods, and sweets.  - Eat fish and poultry at least twice a week.              - Get plenty of exercise (150 minutes per week).     Tobacco dependency  Patient current daily smoker  Counseled on smoking cessation fro greater than 10min   Decline referral to smoking cessation at  this time    Syncope and collapse  Asymptomatic  Currently not a problem    Follow Up  Follow up in about 3 months (around 2/15/2025), or if symptoms worsen or fail to improve.       [facility] - Cardiology    Total professional time spent for the encounter: 40 minutes  Time was spent preparing to see the patient, reviewing results of prior testing, obtaining and/or reviewing separately obtained history, performing a medically appropriate examination and interview, counseling and educating the patient/family, ordering medications/tests/procedures, referring and communicating with other health care professionals, documenting clinical information in the electronic health record, and independently interpreting results.

## 2024-11-25 ENCOUNTER — CLINICAL SUPPORT (OUTPATIENT)
Dept: CARDIOLOGY | Facility: CLINIC | Age: 44
End: 2024-11-25
Payer: COMMERCIAL

## 2024-11-25 VITALS — OXYGEN SATURATION: 96 % | SYSTOLIC BLOOD PRESSURE: 120 MMHG | HEART RATE: 77 BPM | DIASTOLIC BLOOD PRESSURE: 84 MMHG

## 2024-11-25 DIAGNOSIS — Z01.30 BLOOD PRESSURE CHECK: Primary | ICD-10-CM

## 2024-11-25 PROCEDURE — 99999 PR PBB SHADOW E&M-EST. PATIENT-LVL II: CPT | Mod: PBBFAC,,,

## 2024-11-25 NOTE — PROGRESS NOTES
"2 week follow up BP check for JAMAR Carrion.  Patient is currently taking amlodipine 5 mg daily, olmesartan 40 mg daily, and carvedilol 6.25 mg twice a day.  Patient reports dizziness and one episode of "dozing off while awake".  Patient denies N/V, headaches, blurred vision.      JAMAR Carrion advised of visit via chart.  Advised patient, office will contact him with provider recommendations.  Patient verbalized understanding.    "

## 2024-12-03 ENCOUNTER — OFFICE VISIT (OUTPATIENT)
Dept: PRIMARY CARE CLINIC | Facility: CLINIC | Age: 44
End: 2024-12-03
Payer: COMMERCIAL

## 2024-12-03 VITALS
RESPIRATION RATE: 18 BRPM | HEIGHT: 70 IN | SYSTOLIC BLOOD PRESSURE: 116 MMHG | BODY MASS INDEX: 39.01 KG/M2 | HEART RATE: 85 BPM | DIASTOLIC BLOOD PRESSURE: 78 MMHG | OXYGEN SATURATION: 98 % | WEIGHT: 272.5 LBS | TEMPERATURE: 97 F

## 2024-12-03 DIAGNOSIS — Z00.00 ANNUAL PHYSICAL EXAM: Primary | ICD-10-CM

## 2024-12-03 DIAGNOSIS — Z13.1 SCREENING FOR DIABETES MELLITUS: ICD-10-CM

## 2024-12-03 DIAGNOSIS — E78.2 MIXED HYPERLIPIDEMIA: ICD-10-CM

## 2024-12-03 DIAGNOSIS — M10.00 IDIOPATHIC GOUT, UNSPECIFIED CHRONICITY, UNSPECIFIED SITE: ICD-10-CM

## 2024-12-03 DIAGNOSIS — R06.2 WHEEZING: ICD-10-CM

## 2024-12-03 DIAGNOSIS — Z23 NEED FOR VACCINATION: ICD-10-CM

## 2024-12-03 DIAGNOSIS — I10 ESSENTIAL HYPERTENSION, BENIGN: ICD-10-CM

## 2024-12-03 DIAGNOSIS — F17.210 CIGARETTE NICOTINE DEPENDENCE WITHOUT COMPLICATION: ICD-10-CM

## 2024-12-03 PROBLEM — R55 SYNCOPE AND COLLAPSE: Status: RESOLVED | Noted: 2023-10-25 | Resolved: 2024-12-03

## 2024-12-03 PROCEDURE — 3078F DIAST BP <80 MM HG: CPT | Mod: CPTII,S$GLB,, | Performed by: FAMILY MEDICINE

## 2024-12-03 PROCEDURE — 99999 PR PBB SHADOW E&M-EST. PATIENT-LVL III: CPT | Mod: PBBFAC,,, | Performed by: FAMILY MEDICINE

## 2024-12-03 PROCEDURE — 3008F BODY MASS INDEX DOCD: CPT | Mod: CPTII,S$GLB,, | Performed by: FAMILY MEDICINE

## 2024-12-03 PROCEDURE — 99396 PREV VISIT EST AGE 40-64: CPT | Mod: 25,S$GLB,, | Performed by: FAMILY MEDICINE

## 2024-12-03 PROCEDURE — 4010F ACE/ARB THERAPY RXD/TAKEN: CPT | Mod: CPTII,S$GLB,, | Performed by: FAMILY MEDICINE

## 2024-12-03 PROCEDURE — 90656 IIV3 VACC NO PRSV 0.5 ML IM: CPT | Mod: S$GLB,,, | Performed by: FAMILY MEDICINE

## 2024-12-03 PROCEDURE — 1159F MED LIST DOCD IN RCRD: CPT | Mod: CPTII,S$GLB,, | Performed by: FAMILY MEDICINE

## 2024-12-03 PROCEDURE — 3074F SYST BP LT 130 MM HG: CPT | Mod: CPTII,S$GLB,, | Performed by: FAMILY MEDICINE

## 2024-12-03 PROCEDURE — 90471 IMMUNIZATION ADMIN: CPT | Mod: S$GLB,,, | Performed by: FAMILY MEDICINE

## 2024-12-03 PROCEDURE — 1160F RVW MEDS BY RX/DR IN RCRD: CPT | Mod: CPTII,S$GLB,, | Performed by: FAMILY MEDICINE

## 2024-12-03 RX ORDER — INDOMETHACIN 50 MG/1
50 CAPSULE ORAL 3 TIMES DAILY PRN
Qty: 30 CAPSULE | Refills: 3 | Status: SHIPPED | OUTPATIENT
Start: 2024-12-03

## 2024-12-03 RX ORDER — OLMESARTAN MEDOXOMIL 40 MG/1
40 TABLET ORAL DAILY
Qty: 90 TABLET | Refills: 4 | Status: SHIPPED | OUTPATIENT
Start: 2024-12-03

## 2024-12-03 RX ORDER — ALBUTEROL SULFATE 90 UG/1
2 INHALANT RESPIRATORY (INHALATION) EVERY 4 HOURS PRN
Qty: 18 G | Refills: 1 | Status: SHIPPED | OUTPATIENT
Start: 2024-12-03

## 2024-12-03 RX ORDER — INDOMETHACIN 50 MG/1
50 CAPSULE ORAL 3 TIMES DAILY PRN
COMMUNITY
End: 2024-12-03 | Stop reason: SDUPTHER

## 2024-12-03 NOTE — PROGRESS NOTES
Assessment:       1. Annual physical exam    2. Need for vaccination    3. Essential hypertension, benign    4. Idiopathic gout, unspecified chronicity, unspecified site    5. Screening for diabetes mellitus    6. Mixed hyperlipidemia    7. Cigarette nicotine dependence without complication    8. Wheezing         Plan:       Annual physical exam  -     CBC Auto Differential; Future; Expected date: 12/03/2024  -     Comprehensive Metabolic Panel; Future; Expected date: 12/03/2024  -     Lipid Panel; Future; Expected date: 12/03/2024  -     Hemoglobin A1C; Future; Expected date: 12/03/2024  -     TSH; Future; Expected date: 12/03/2024  -     Uric Acid; Future; Expected date: 12/03/2024    Need for vaccination  -     influenza (Flulaval, Fluzone, Fluarix) 45 mcg/0.5 mL IM vaccine (> or = 6 mo) 0.5 mL    Essential hypertension, benign  -     olmesartan (BENICAR) 40 MG tablet; Take 1 tablet (40 mg total) by mouth once daily.  Dispense: 90 tablet; Refill: 4    Idiopathic gout, unspecified chronicity, unspecified site  -     indomethacin (INDOCIN) 50 MG capsule; Take 1 capsule (50 mg total) by mouth 3 (three) times daily as needed (gout).  Dispense: 30 capsule; Refill: 3  -     Uric Acid; Future; Expected date: 12/03/2024    Screening for diabetes mellitus  -     Hemoglobin A1C; Future; Expected date: 12/03/2024    Mixed hyperlipidemia  -     CBC Auto Differential; Future; Expected date: 12/03/2024  -     Comprehensive Metabolic Panel; Future; Expected date: 12/03/2024  -     Lipid Panel; Future; Expected date: 12/03/2024    Cigarette nicotine dependence without complication    Wheezing  -     albuterol (PROVENTIL/VENTOLIN HFA) 90 mcg/actuation inhaler; Inhale 2 puffs into the lungs every 4 (four) hours as needed for Wheezing or Shortness of Breath. Rescue  Dispense: 18 g; Refill: 1      Assessment & Plan    - Assessed blood pressure control, noting improvement with current medication regimen  - Evaluated recent cold  symptoms, which are gradually improving  - Reviewed gout management, noting effectiveness of current as-needed treatment with indomethacin  - Assessed cholesterol management, noting improvement after adjusting timing of medication administration  - Evaluated respiratory symptoms, noting expiratory wheezes likely due to recent cold and smoking history  - Considered smoking cessation options, as patient expressed readiness to quit    OBESITY AND WEIGHT MANAGEMENT:   Discussed importance of carbohydrate reduction for weight management.   Patient to reduce carbohydrate intake, especially bread, rice, pasta, potatoes, sugary drinks, and fruit.    CHRONIC OBSTRUCTIVE PULMONARY DISEASE AND RESPIRATORY SYMPTOMS:   Explained potential benefits of using albuterol inhaler for respiratory symptoms.   Started albuterol inhaler for use during respiratory symptoms.    NICOTINE DEPENDENCE AND SMOKING CESSATION:   Recommend participating in smoking cessation program.    IMMUNIZATION:   Patient to get COVID shot before the end of the year.   Flu shot administered during visit.    HYPERTENSION:   Continued olmesartan; provided refill.    GOUT:   Continued indomethacin for as-needed use in gout flare-ups.    LABS:   Ordered CMP, A1C, and TSH.    FOLLOW-UP:   Follow up in 3 months for blood pressure control, cold symptoms, gout management, cholesterol management, and respiratory symptoms evaluation.       Medication List with Changes/Refills   New Medications    ALBUTEROL (PROVENTIL/VENTOLIN HFA) 90 MCG/ACTUATION INHALER    Inhale 2 puffs into the lungs every 4 (four) hours as needed for Wheezing or Shortness of Breath. Rescue   Current Medications    AMLODIPINE (NORVASC) 5 MG TABLET    Take 1 tablet (5 mg total) by mouth once daily.    ATORVASTATIN (LIPITOR) 40 MG TABLET    Take 1 tablet (40 mg total) by mouth once daily.    CARVEDILOL (COREG) 6.25 MG TABLET    Take 1 tablet (6.25 mg total) by mouth 2 (two) times daily with meals.     "CLOBETASOL (TEMOVATE) 0.05 % CREAM    Apply topically 2 (two) times daily. Prn hand rash.Stop using steroid topical when skin is smooth and non itchy.  Do not treat dark or red coloring.   Changed and/or Refilled Medications    Modified Medication Previous Medication    INDOMETHACIN (INDOCIN) 50 MG CAPSULE indomethacin (INDOCIN) 50 MG capsule       Take 1 capsule (50 mg total) by mouth 3 (three) times daily as needed (gout).    Take 50 mg by mouth 3 (three) times daily as needed (gout).    OLMESARTAN (BENICAR) 40 MG TABLET olmesartan (BENICAR) 40 MG tablet       Take 1 tablet (40 mg total) by mouth once daily.    Take 1 tablet (40 mg total) by mouth once daily.         Subjective:    Patient ID: Jeremy Fried is a 44 y.o. male.  Chief Complaint: Annual Exam    HPI  History of Present Illness    CHIEF COMPLAINT:  Patient presents today for an annual checkup.    CARDIOVASCULAR:  He reports his blood pressure is under control after consulting with three doctors and taking four medications. He experiences shortness of breath, which he attributes to low blood pressure. He denies recent episodes of passing out, chest pain, or palpitations. However, he reports experiencing fainting episodes, with the most recent occurring a few months ago. These episodes have happened multiple times since the initial incident.    RESPIRATORY:  He reports a recent "nagging" cold that lasted for two weeks, which is slowly improving. He denies any other recent illnesses.    GOUT:  He experienced a small gout flare-up recently. He takes indomethacin when a flare-up occurs and currently has some medication left over.    MEDICATIONS:  He reports experiencing muscle aches when taking cholesterol medication at the wrong time of day, which improved after adjusting the timing. He needs a refill of Olmesartan.    WEIGHT MANAGEMENT:  He is struggling to lose weight. His weight was 276 lbs a week ago and is 272 lbs today. His diet includes a lot of " "rice.    SOCIAL HISTORY:  He smokes a pack of cigarettes a day and expresses being more serious about quitting.    IMMUNIZATIONS:  He is overdue for a COVID shot.      ROS:  Respiratory: +shortness of breath  Cardiovascular: -chest pain, -palpitations  Musculoskeletal: +muscle pain       Review of Systems    Objective:      Vitals:    12/03/24 1016   BP: 116/78   BP Location: Left arm   Patient Position: Sitting   Pulse: 85   Resp: 18   Temp: 97.1 °F (36.2 °C)   TempSrc: Temporal   SpO2: 98%   Weight: 123.6 kg (272 lb 7.8 oz)   Height: 5' 10" (1.778 m)     BP Readings from Last 5 Encounters:   12/03/24 116/78   11/25/24 120/84   11/15/24 (!) 162/108   07/10/24 (!) 175/115   04/02/24 (!) 144/92     Wt Readings from Last 5 Encounters:   12/03/24 123.6 kg (272 lb 7.8 oz)   11/15/24 125.1 kg (275 lb 14.5 oz)   07/10/24 122.3 kg (269 lb 10 oz)   04/02/24 121.3 kg (267 lb 8.5 oz)   01/29/24 123 kg (271 lb 0.9 oz)     Physical Exam  Physical Exam    General: Well-developed. Well-nourished. No acute distress.  Eyes: EOMI. Sclerae anicteric.  HENT: Normocephalic. Atraumatic. Nares patent. Moist oral mucosa.  Cardiovascular: Regular rate. Regular rhythm. No murmurs. No rubs. No gallops. Normal S1, S2.  Respiratory: Normal respiratory effort. Clear to auscultation bilaterally. No rales. No rhonchi. Expiratory wheezes. Wheezing.  Musculoskeletal: No  obvious deformity.  Extremities: No lower extremity edema.  Neurological: Alert & oriented x3. No slurred speech. Normal gait.  Psychiatric: Normal mood. Normal affect. Good insight. Good judgment.  Skin: Warm. Dry. No rash.         Lab Results   Component Value Date    WBC 10.61 12/03/2024    HGB 15.5 12/03/2024    HCT 47.0 12/03/2024     12/03/2024    CHOL 180 12/03/2024    TRIG 165 (H) 12/03/2024    HDL 32 (L) 12/03/2024    ALT 66 (H) 12/03/2024    AST 33 12/03/2024     12/03/2024    K 4.7 12/03/2024     12/03/2024    CREATININE 1.2 12/03/2024    BUN 13 " 12/03/2024    CO2 27 12/03/2024    TSH 1.379 07/25/2023    HGBA1C 5.1 07/25/2023      This note was generated with the assistance of ambient listening technology. Verbal consent was obtained by the patient and accompanying visitor(s) for the recording of patient appointment to facilitate this note. I attest to having reviewed and edited the generated note for accuracy, though some syntax or spelling errors may persist. Please contact the author of this note for any clarification.

## 2025-01-16 ENCOUNTER — TELEPHONE (OUTPATIENT)
Dept: PHARMACY | Facility: CLINIC | Age: 45
End: 2025-01-16
Payer: COMMERCIAL

## 2025-01-17 NOTE — TELEPHONE ENCOUNTER
[facility]/Population Health Chart Review & Patient Outreach Details For Medication Adherence Project    Reason for Outreach Encounter: 3rd Party payor non-compliance report ([employer], [employer], C, etc)  2.  Patient Outreach Method: Reviewed patient chart   3.   Medication in question:    Hypertension Medications               amLODIPine (NORVASC) 5 MG tablet Take 1 tablet (5 mg total) by mouth once daily.    carvediloL (COREG) 6.25 MG tablet Take 1 tablet (6.25 mg total) by mouth 2 (two) times daily with meals.    olmesartan (BENICAR) 40 MG tablet Take 1 tablet (40 mg total) by mouth once daily.                 Benicar  last filled  1/8/25 for 90 day supply      4.  Reviewed and or Updates Made To: Patient Chart  5. Outreach Outcomes and/or actions taken: Patient filled medication and is on track to be adherent  Additional Notes:

## 2025-02-17 ENCOUNTER — PATIENT MESSAGE (OUTPATIENT)
Dept: CARDIOLOGY | Facility: CLINIC | Age: 45
End: 2025-02-17
Payer: COMMERCIAL